# Patient Record
Sex: FEMALE | Race: WHITE | URBAN - METROPOLITAN AREA
[De-identification: names, ages, dates, MRNs, and addresses within clinical notes are randomized per-mention and may not be internally consistent; named-entity substitution may affect disease eponyms.]

---

## 2021-02-20 ENCOUNTER — IMMUNIZATION (OUTPATIENT)
Dept: PRIMARY CARE CLINIC | Age: 66
End: 2021-02-20
Payer: COMMERCIAL

## 2021-02-20 PROCEDURE — 0001A COVID-19, PFIZER VACCINE 30MCG/0.3ML DOSE: CPT | Performed by: NURSE PRACTITIONER

## 2021-02-20 PROCEDURE — 91300 COVID-19, PFIZER VACCINE 30MCG/0.3ML DOSE: CPT | Performed by: NURSE PRACTITIONER

## 2021-03-11 ENCOUNTER — IMMUNIZATION (OUTPATIENT)
Dept: PRIMARY CARE CLINIC | Age: 66
End: 2021-03-11
Payer: COMMERCIAL

## 2021-03-11 PROCEDURE — 91300 COVID-19, PFIZER VACCINE 30MCG/0.3ML DOSE: CPT | Performed by: NURSE PRACTITIONER

## 2021-03-11 PROCEDURE — 0002A COVID-19, PFIZER VACCINE 30MCG/0.3ML DOSE: CPT | Performed by: NURSE PRACTITIONER

## 2021-12-03 ENCOUNTER — IMMUNIZATION (OUTPATIENT)
Dept: PRIMARY CARE CLINIC | Age: 66
End: 2021-12-03
Payer: COMMERCIAL

## 2021-12-03 PROCEDURE — 0004A COVID-19, PFIZER VACCINE 30MCG/0.3ML DOSE: CPT | Performed by: NURSE PRACTITIONER

## 2021-12-03 PROCEDURE — 91300 COVID-19, PFIZER VACCINE 30MCG/0.3ML DOSE: CPT | Performed by: NURSE PRACTITIONER

## 2023-02-06 ENCOUNTER — INITIAL CONSULT (OUTPATIENT)
Dept: SURGERY | Age: 68
End: 2023-02-06
Payer: COMMERCIAL

## 2023-02-06 ENCOUNTER — TELEPHONE (OUTPATIENT)
Dept: BARIATRICS/WEIGHT MGMT | Age: 68
End: 2023-02-06

## 2023-02-06 VITALS
WEIGHT: 215 LBS | HEART RATE: 88 BPM | BODY MASS INDEX: 34.55 KG/M2 | SYSTOLIC BLOOD PRESSURE: 146 MMHG | TEMPERATURE: 98.2 F | DIASTOLIC BLOOD PRESSURE: 91 MMHG | HEIGHT: 66 IN

## 2023-02-06 DIAGNOSIS — M25.819: ICD-10-CM

## 2023-02-06 DIAGNOSIS — M25.512 CHRONIC LEFT SHOULDER PAIN: Primary | ICD-10-CM

## 2023-02-06 DIAGNOSIS — G89.29 CHRONIC LEFT SHOULDER PAIN: Primary | ICD-10-CM

## 2023-02-06 PROCEDURE — 3017F COLORECTAL CA SCREEN DOC REV: CPT | Performed by: SURGERY

## 2023-02-06 PROCEDURE — G8417 CALC BMI ABV UP PARAM F/U: HCPCS | Performed by: SURGERY

## 2023-02-06 PROCEDURE — 99203 OFFICE O/P NEW LOW 30 MIN: CPT | Performed by: SURGERY

## 2023-02-06 PROCEDURE — 1123F ACP DISCUSS/DSCN MKR DOCD: CPT | Performed by: SURGERY

## 2023-02-06 PROCEDURE — G8400 PT W/DXA NO RESULTS DOC: HCPCS | Performed by: SURGERY

## 2023-02-06 PROCEDURE — G8484 FLU IMMUNIZE NO ADMIN: HCPCS | Performed by: SURGERY

## 2023-02-06 PROCEDURE — 1090F PRES/ABSN URINE INCON ASSESS: CPT | Performed by: SURGERY

## 2023-02-06 PROCEDURE — 4004F PT TOBACCO SCREEN RCVD TLK: CPT | Performed by: SURGERY

## 2023-02-06 PROCEDURE — G8427 DOCREV CUR MEDS BY ELIG CLIN: HCPCS | Performed by: SURGERY

## 2023-02-06 RX ORDER — LOSARTAN POTASSIUM 50 MG/1
50 TABLET ORAL DAILY
COMMUNITY

## 2023-02-06 NOTE — PROGRESS NOTES
General Surgery History and Physical  [facility]    Patient's Name/Date of Birth: Anette Lea / 1955    Date: February 6, 2023     Surgeon: Donna Blake M.D.    PCP: Kym Zimmer MD     Chief Complaint: left shoulder mass    HPI:   Anette Lea is a 76 y.o. female who presents for evaluation of left shoulder mass. Timing is constant, radiation to left shoulder, alleviated by none and started months at least and severity is 7/10. States several months of mass on left shoulder, mild tender. She does significant lifting of boxes at work. Denies SOB, chest pain. No rapid enlargement. No numbness in her arm. It is tender while palpating it but otherwise she is not having significant pain from it. Denies similar lumps or bumps anywhere else on her body. Denies any overlying skin changes. There is no problem list on file for this patient. Past Medical History:   Diagnosis Date    Back pain     Hypertension     Thyroid disease        No past surgical history on file. Allergies   Allergen Reactions    Penicillin G Rash    Penicillins Hives and Rash       The patient has a family history that is negative for severe cardiovascular or respiratory issues, negative for reaction to anesthesia. Specifically the patient reported no history of gastrointestinal cancer in his mother or his father to their knowledge. Time spent reviewing past medical, surgical, social and family history, vitals, nursing assessment and images. No changes from above documented history.     Social History     Socioeconomic History    Marital status:      Spouse name: Not on file    Number of children: Not on file    Years of education: Not on file    Highest education level: Not on file   Occupational History    Not on file   Tobacco Use    Smoking status: Unknown    Smokeless tobacco: Not on file   Substance and Sexual Activity    Alcohol use: Not on file    Drug use: Not on file    Sexual activity: Not on file   Other Topics Concern    Not on file   Social History Narrative    Not on file     Social Determinants of Health     Financial Resource Strain: Not on file   Food Insecurity: Not on file   Transportation Needs: Not on file   Physical Activity: Not on file   Stress: Not on file   Social Connections: Not on file   Intimate Partner Violence: Not on file   Housing Stability: Not on file       I have reviewed relevant labs from this admission and interpretation is included in my assessment and plan    Review of Systems    A complete 10 system review was performed and are otherwise negative unless mentioned in the above HPI. Specific negatives are listed below but may not include all those reviewed.     General ROS: negative obtundation, AMS  ENT ROS: negative rhinorrhea, epistaxis  Allergy and Immunology ROS: negative itchy/watery eyes or nasal congestion  Hematological and Lymphatic ROS: negative spontaneous bleeding or bruising  Endocrine ROS: negative  lethargy, mood swings, palpitations or polydipsia/polyuria  Respiratory ROS: negative sputum changes, stridor, tachypnea or wheezing  Cardiovascular ROS: negative for - loss of consciousness, murmur or orthopnea  Gastrointestinal ROS: negative for - hematochezia or hematemesis  Genito-Urinary ROS: negative for -  genital discharge or hematuria  Musculoskeletal ROS: negative for - focal weakness, gangrene  Psych/Neuro ROS: negative for - visual or auditory hallucinations, suicidal ideation    Physical exam:   BP (!) 146/91   Pulse 88   Temp 98.2 °F (36.8 °C)   Ht 5' 6\" (1.676 m)   Wt 215 lb (97.5 kg)   BMI 34.70 kg/m²   General appearance:  NAD, appears stated age  Head: NCAT, PERRLA, EOMI, red conjunctiva  Neck: supple, no masses, trachea midline  Lungs: Equal chest rise bilateral, no retractions, no wheezing  Heart: Reg rate  Abdomen: soft, nontender, nondistended  Skin; warm and dry, no cyanosis  Gu: no cva tenderness  Extremities: Small 3 cm mass that is overlying the left acromioclavicular junction, ballotable consistent with likely underlying ganglionic cyst, nonmobile fixed underlying structures, no overlying skin change  Psych: No tremor, visual hallucinations      Radiology: I reviewed relevant abdominal imaging from this admission and that available in the EMR     Assessment:  Diego Calle is a 76 y.o. 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 Plan:  Ultrasound left shoulder evaluate for cyst involving tendon sheath  Pending findings she may need referral to orthopedics for excision if she is increasingly symptomatic from it  If the soft tissue mass is not adhered to underlying structures or involving tendon sheath we may be able to excise it  Telephone follow-up after ultrasound        NOTE: This report, in part or full, may have been transcribed using voice recognition software. Every effort was made to ensure accuracy; however, inadvertent computerized transcription errors may be present. Please excuse any transcriptional grammatical or spelling errors that may have escaped my editorial review.     Cinthia Gil MD  8:35 AM  2/6/2023

## 2023-02-08 ENCOUNTER — TELEPHONE (OUTPATIENT)
Dept: BARIATRICS/WEIGHT MGMT | Age: 68
End: 2023-02-08

## 2023-02-13 ENCOUNTER — SCHEDULED TELEPHONE ENCOUNTER (OUTPATIENT)
Dept: SURGERY | Age: 68
End: 2023-02-13
Payer: COMMERCIAL

## 2023-02-13 ENCOUNTER — TELEPHONE (OUTPATIENT)
Dept: SURGERY | Age: 68
End: 2023-02-13

## 2023-02-13 DIAGNOSIS — M25.819 CYST OF JOINT OF SHOULDER: Primary | ICD-10-CM

## 2023-02-13 PROCEDURE — 99441 PR PHYS/QHP TELEPHONE EVALUATION 5-10 MIN: CPT | Performed by: SURGERY

## 2023-02-13 NOTE — PROGRESS NOTES
General Surgery History and Physical  [facility]  Telephone follow up    Start time: 7602  End time: [phone] were provided through a telephone synchronous discussion virtually to substitute for in-person clinic visit. The patient was advised of the risks, benefits and alternatives to telemedicine and agreed to proceed with this type of visit. Pursuant to the emergency declaration under the [facility] [place] [address] [facility] authority and the [employer] Act, this Virtual  Visit was conducted, with patient's consent, to reduce the patient's risk of exposure to COVID-19 and provide continuity of care. The patient was also advised the privacy standards of a standard visit continue to apply to telemedicine visits. Time spent time with telephone encounter with patient and family counciling and discussing care exceeded 10min. Additional time spent reviewing images and labs, discussing case with nursing, support staff and other physicians; as well as coordinating care [id number]. Patient's Name/Date of Birth: Travon Garduno / 1955    Date: February 13, 2023     Surgeon: Rodney Hassan M.D.    PCP: Courtney Qureshi MD     Chief Complaint: left shoulder mass    HPI:   Travon Garduno is a 76 y.o. female who presents for evaluation of left shoulder mass. Timing is constant, radiation to left shoulder, alleviated by none and started months at least and severity is 7/10. States several months of mass on left shoulder, mild tender. She does significant lifting of boxes at work. Denies SOB, chest pain. No rapid enlargement. No numbness in her arm. It is tender while palpating it but otherwise she is not having significant pain from it. Denies similar lumps or bumps anywhere else on her body. Denies any overlying skin changes.     Follow-up phone follow-up after ultrasound with findings of a 1.9 x 2.3 cm septated complex cyst.  Denies any significant change in her complaints since her last visit. There is no problem list on file for this patient. Past Medical History:   Diagnosis Date    Back pain     Hypertension     Thyroid disease        No past surgical history on file. Allergies   Allergen Reactions    Penicillin G Rash    Penicillins Hives and Rash       The patient has a family history that is negative for severe cardiovascular or respiratory issues, negative for reaction to anesthesia. Specifically the patient reported no history of gastrointestinal cancer in his mother or his father to their knowledge. Time spent reviewing past medical, surgical, social and family history, vitals, nursing assessment and images. No changes from above documented history. Social History     Socioeconomic History    Marital status:      Spouse name: Not on file    Number of children: Not on file    Years of education: Not on file    Highest education level: Not on file   Occupational History    Not on file   Tobacco Use    Smoking status: Unknown    Smokeless tobacco: Not on file   Substance and Sexual Activity    Alcohol use: Not on file    Drug use: Not on file    Sexual activity: Not on file   Other Topics Concern    Not on file   Social History Narrative    Not on file     Social Determinants of Health     Financial Resource Strain: Not on file   Food Insecurity: Not on file   Transportation Needs: Not on file   Physical Activity: Not on file   Stress: Not on file   Social Connections: Not on file   Intimate Partner Violence: Not on file   Housing Stability: Not on file       I have reviewed relevant labs from this admission and interpretation is included in my assessment and plan    Review of Systems    A complete 10 system review was performed and are otherwise negative unless mentioned in the above HPI. Specific negatives are listed below but may not include all those reviewed.     General ROS: negative obtundation, AMS  ENT ROS: negative rhinorrhea, epistaxis  Allergy and Immunology ROS: negative itchy/watery eyes or nasal congestion  Hematological and Lymphatic ROS: negative spontaneous bleeding or bruising  Endocrine ROS: negative  lethargy, mood swings, palpitations or polydipsia/polyuria  Respiratory ROS: negative sputum changes, stridor, tachypnea or wheezing  Cardiovascular ROS: negative for - loss of consciousness, murmur or orthopnea  Gastrointestinal ROS: negative for - hematochezia or hematemesis  Genito-Urinary ROS: negative for -  genital discharge or hematuria  Musculoskeletal ROS: negative for - focal weakness, gangrene  Psych/Neuro ROS: negative for - visual or auditory hallucinations, suicidal ideation    Physical exam:   There were no vitals taken for this visit. General appearance:  NAD, appears stated age  Head: NCAT, PERRLA, EOMI, red conjunctiva  Neck: supple, no masses, trachea midline  Lungs: Equal chest rise bilateral, no retractions, no wheezing  Heart: Reg rate  Abdomen: soft, nontender, nondistended  Skin; warm and dry, no cyanosis  Gu: no cva tenderness  Extremities: Small 3 cm mass that is overlying the left acromioclavicular junction, ballotable consistent with likely underlying ganglionic cyst, nonmobile fixed underlying structures, no overlying skin change  Psych: No tremor, visual hallucinations      Radiology: I reviewed relevant abdominal imaging from this admission and that available in the EMR   Impression   2.3 cm complex cyst with septation left anterior shoulder in the area of   palpable concern. RECOMMENDATION:   Clinical follow-up. Assessment:  Cassia Walker is a 76 y.o. 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         Plan:  Referral to orthopedics- suspect ganglionic cyst but septation concerning  Follow up as needed with me        NOTE: This report, in part or full, may have been transcribed using voice recognition software. Every effort was made to ensure accuracy; however, inadvertent computerized transcription errors may be present. Please excuse any transcriptional grammatical or spelling errors that may have escaped my editorial review.     Karla Conroy MD  10:37 AM  2/13/2023

## 2023-02-13 NOTE — TELEPHONE ENCOUNTER
Referral sent to Dr. Destinee Bourgeois for shoulder joint cyst.   Office to contact patient to schedule.   Electronically signed by Jc Villa RN on 2/13/2023 at 1:42 PM

## 2023-02-24 DIAGNOSIS — M25.512 LEFT SHOULDER PAIN, UNSPECIFIED CHRONICITY: Primary | ICD-10-CM

## 2023-02-28 ENCOUNTER — OFFICE VISIT (OUTPATIENT)
Dept: ORTHOPEDIC SURGERY | Age: 68
End: 2023-02-28
Payer: COMMERCIAL

## 2023-02-28 VITALS — BODY MASS INDEX: 34.55 KG/M2 | TEMPERATURE: 98 F | HEIGHT: 66 IN | WEIGHT: 215 LBS

## 2023-02-28 DIAGNOSIS — M19.012 ARTHRITIS OF LEFT ACROMIOCLAVICULAR JOINT: Primary | ICD-10-CM

## 2023-02-28 PROCEDURE — 1090F PRES/ABSN URINE INCON ASSESS: CPT | Performed by: ORTHOPAEDIC SURGERY

## 2023-02-28 PROCEDURE — 1123F ACP DISCUSS/DSCN MKR DOCD: CPT | Performed by: ORTHOPAEDIC SURGERY

## 2023-02-28 PROCEDURE — 99202 OFFICE O/P NEW SF 15 MIN: CPT | Performed by: ORTHOPAEDIC SURGERY

## 2023-02-28 PROCEDURE — G8417 CALC BMI ABV UP PARAM F/U: HCPCS | Performed by: ORTHOPAEDIC SURGERY

## 2023-02-28 PROCEDURE — G8427 DOCREV CUR MEDS BY ELIG CLIN: HCPCS | Performed by: ORTHOPAEDIC SURGERY

## 2023-02-28 PROCEDURE — G8400 PT W/DXA NO RESULTS DOC: HCPCS | Performed by: ORTHOPAEDIC SURGERY

## 2023-02-28 PROCEDURE — 4004F PT TOBACCO SCREEN RCVD TLK: CPT | Performed by: ORTHOPAEDIC SURGERY

## 2023-02-28 PROCEDURE — 3017F COLORECTAL CA SCREEN DOC REV: CPT | Performed by: ORTHOPAEDIC SURGERY

## 2023-02-28 PROCEDURE — G8484 FLU IMMUNIZE NO ADMIN: HCPCS | Performed by: ORTHOPAEDIC SURGERY

## 2023-02-28 NOTE — PROGRESS NOTES
Chief Complaint   Patient presents with    Shoulder Pain     New patient left shoulder bump for about 2 months. Patient states she has bumped it a few times but states no major injury. Bump is located on the distal end of the clavicle. US and xrays have been done. No treatment to date.        Leticia Nogueira is a 68 y.o. year old   female who is seen today  for evaluation of left shoulder pain.  She reports the pain has been ongoing for the past 2 months.  She does not recall a specific injury which started the pain.  .   She reports the pain is worse with , pain has resolved, cyst has gone down, better with rest.  The patient does not have mechanical symptoms.  Shedoes not have night pain.  She denies a feeling of instability.  The prior treatments have been none.  The patient   has responded to the treatment. The patient is right hand dominant.     Chief Complaint   Patient presents with    Shoulder Pain     New patient left shoulder bump for about 2 months. Patient states she has bumped it a few times but states no major injury. Bump is located on the distal end of the clavicle. US and xrays have been done. No treatment to date.     Past Medical History:   Diagnosis Date    Back pain     Hypertension     Thyroid disease      No past surgical history on file.    Current Outpatient Medications:     losartan (COZAAR) 50 MG tablet, Take 50 mg by mouth daily, Disp: , Rfl:     levothyroxine (SYNTHROID) 88 MCG tablet, Take 100 mcg by mouth Daily, Disp: , Rfl:     gabapentin (NEURONTIN) 300 MG capsule, Take 300 mg by mouth nightly, Disp: , Rfl:     valsartan (DIOVAN) 80 MG tablet, Take 80 mg by mouth daily (Patient not taking: Reported on 2/6/2023), Disp: , Rfl:     naproxen (NAPROSYN) 500 MG tablet, Take 1 tablet by mouth 2 times daily as needed for Pain (Patient not taking: Reported on 2/6/2023), Disp: 10 tablet, Rfl: 0  Allergies   Allergen Reactions    Sulfa Antibiotics Other (See Comments)    Penicillin G Rash  Penicillins Hives and Rash     Social History     Socioeconomic History    Marital status:      Spouse name: Not on file    Number of children: Not on file    Years of education: Not on file    Highest education level: Not on file   Occupational History    Not on file   Tobacco Use    Smoking status: Unknown    Smokeless tobacco: Not on file   Substance and Sexual Activity    Alcohol use: Not on file    Drug use: Not on file    Sexual activity: Not on file   Other Topics Concern    Not on file   Social History Narrative    Not on file     Social Determinants of Health     Financial Resource Strain: Not on file   Food Insecurity: Not on file   Transportation Needs: Not on file   Physical Activity: Not on file   Stress: Not on file   Social Connections: Not on file   Intimate Partner Violence: Not on file   Housing Stability: Not on file     No family history on file. REVIEW OF SYSTEMS:     General/Constitution:  (-)weight loss, (-)fever, (-)chills, (-)weakness. Skin: (-) rash,(-) psoriasis,(-) eczema, (-)skin cancer. Musculoskeletal: (-) fractures,  (-) dislocations,(-) collagen vascular disease, (-) fibromyalgia, (-) multiple sclerosis, (-) muscular dystrophy, (-) RSD,(-) joint pain (-)swelling, (-) joint pain,swelling. Neurologic: (-) epilepsy, (-)seizures,(-) brain tumor,(-) TIA, (-)stroke, (-)headaches, (-)Parkinson disease,(-) memory loss, (-) LOC. Cardiovascular: (-) Chest pain, (-) swelling in legs/feet, (-) SOB, (-) cramping in legs/feet with walking. Respiratory: (-) SOB, (-) Coughing, (-) night sweats. GI: (-) nausea, (-) vomiting, (-) diarrhea, (-) blood in stool, (-) gastric ulcer. Psychiatric: (-) Depression, (-) Anxiety, (-) bipolar disease, (-) Alzheimer's Disease  Allergic/Immunologic: (-) allergies latex, (-) allergies metal, (-) skin sensitivity.   Hematlogic: (-) anemia, (-) blood transfusion, (-) DVT/PE, (-) Clotting disorders      Subjective:    Constitution:  Temp 98 °F (36.7 °C)   Ht 5' 6\" (1.676 m)   Wt 215 lb (97.5 kg)   BMI 34.70 kg/m²     Psycihatric:  The patient is alert and oriented x 3, appears to be stated age and in no distress. Respiratory:  Respiratory effort is not labored. Patient is not gasping. Palpation of the chest reveals no tactile fremitus. Skin:  Upon inspection: the skin appears warm, dry and intact. There is not a previous scar over the affected area. There is not any cellulitis, lymphedema or cutaneous lesions noted in the lower extremities. Upon palpation there is no induration noted. Neurologic:  Motor exam of the upper extremities show: The reflexes in biceps/triceps/brachioradialis are equal and symmetric. Sensory exam C5-T1 are normal bilaterally. Cardiovascular: The vascular exam is normal and is well perfused to distal extremities. There are 2+ radial pulses bilaterally, and motor and sensation is intact to median, ulnar, and radial, musclocutaneus, and axillary nerve distribution and grossly symmetric bilaterally. There is cap refill noted less than two seconds in all digits. There is not edema of the bilateral upper extremities. There is not varicosities noted in the distal extremities. Lymph:  Upon palpation,  there is no lymphadenopathy noted in bilateral upper extremities. Musculoskeletal:  Gait: normal; examination of the nails and digits reveal no cyanosis or clubbing. Cervical Exam:  On physical exam, Denis Zhu is well-developed, well-nourished, oriented to person, place and time. her gait is normal.  On evaluation of hercervical spine, She has full range of motion of the cervical spine without pain. There is no cervical tenderness to palpation. Shoulder Exam:  On evaluation of her bilaterally upper extremities, her left shoulder has no deformity. There is tenderness upon palpation of the ac joint. There is not evidence of scapular dyskinesis.   There is not muscle atrophy in shoulder girdle. The range of motion for the Right Shoulder is 150/50/t8 and for the Left shoulder is 150/50/t8. Right shoulder Motor strength is 5/5 in the supraspinatus, 5/5 internal rotation and 5/5 in external rotation, and Left shoulder motor strength 5/5 in supraspinatus, 5/5 in internal rotation, 5/5 in external rotation. Small Ac joint ganglion cyst    Right shoulder:  negative Impingement , negative Nunez ,negative  Speeds,negative  Apprehension ,negative Amanda Load Shift, negative Shaq manuver, negative Cross arm test.     Left shoulder:  negative Impingement , negative Nunez ,negative  Speeds,negative  Apprehension ,negative Amanda Load Shift, negative Shaq manuver, negative Cross arm test.     XRAY:  ac joint arthritis    MRI:  n/a    Radiographic findings reviewed with patient    Impression:   Encounter Diagnosis   Name Primary? Arthritis of left acromioclavicular joint Yes       Plan: Natural history and expected course discussed. Questions answered. Educational material distributed. Reduction in offending activity. Gentle ROM exercises  I had a lengthy discussion with the patient regarding their diagnosis. I explained treatment options including surgical vs non surgical treatment. I reviewed in detail the risks and benefits and outlined the procedure in detail with expected outcomes and possible complications. I also discussed non surgical treatment such as injections (CSI and visco supplementation), physical therapy, topical creams and NSAID's. They have elected for conservative management at this time.    Pain has resolved, she will fu prn

## 2023-03-15 ENCOUNTER — OFFICE VISIT (OUTPATIENT)
Dept: BARIATRICS/WEIGHT MGMT | Age: 68
End: 2023-03-15
Payer: COMMERCIAL

## 2023-03-15 VITALS
HEART RATE: 67 BPM | TEMPERATURE: 97.8 F | SYSTOLIC BLOOD PRESSURE: 140 MMHG | BODY MASS INDEX: 41.11 KG/M2 | HEIGHT: 63 IN | WEIGHT: 232 LBS | DIASTOLIC BLOOD PRESSURE: 81 MMHG

## 2023-03-15 DIAGNOSIS — E66.01 CLASS 3 SEVERE OBESITY DUE TO EXCESS CALORIES WITHOUT SERIOUS COMORBIDITY WITH BODY MASS INDEX (BMI) OF 40.0 TO 44.9 IN ADULT (HCC): ICD-10-CM

## 2023-03-15 DIAGNOSIS — I10 ESSENTIAL HYPERTENSION: Primary | ICD-10-CM

## 2023-03-15 PROCEDURE — 99205 OFFICE O/P NEW HI 60 MIN: CPT | Performed by: INTERNAL MEDICINE

## 2023-03-15 PROCEDURE — G8400 PT W/DXA NO RESULTS DOC: HCPCS | Performed by: INTERNAL MEDICINE

## 2023-03-15 PROCEDURE — G8484 FLU IMMUNIZE NO ADMIN: HCPCS | Performed by: INTERNAL MEDICINE

## 2023-03-15 PROCEDURE — 3017F COLORECTAL CA SCREEN DOC REV: CPT | Performed by: INTERNAL MEDICINE

## 2023-03-15 PROCEDURE — 3077F SYST BP >= 140 MM HG: CPT | Performed by: INTERNAL MEDICINE

## 2023-03-15 PROCEDURE — 1036F TOBACCO NON-USER: CPT | Performed by: INTERNAL MEDICINE

## 2023-03-15 PROCEDURE — G8427 DOCREV CUR MEDS BY ELIG CLIN: HCPCS | Performed by: INTERNAL MEDICINE

## 2023-03-15 PROCEDURE — G8417 CALC BMI ABV UP PARAM F/U: HCPCS | Performed by: INTERNAL MEDICINE

## 2023-03-15 PROCEDURE — 99202 OFFICE O/P NEW SF 15 MIN: CPT

## 2023-03-15 PROCEDURE — 3078F DIAST BP <80 MM HG: CPT | Performed by: INTERNAL MEDICINE

## 2023-03-15 PROCEDURE — 1123F ACP DISCUSS/DSCN MKR DOCD: CPT | Performed by: INTERNAL MEDICINE

## 2023-03-15 PROCEDURE — 1090F PRES/ABSN URINE INCON ASSESS: CPT | Performed by: INTERNAL MEDICINE

## 2023-03-15 RX ORDER — LEVOTHYROXINE SODIUM 100 MCG
100 TABLET ORAL DAILY
COMMUNITY
Start: 2023-03-07

## 2023-03-15 RX ORDER — PHENTERMINE HYDROCHLORIDE 37.5 MG/1
37.5 TABLET ORAL
Qty: 30 TABLET | Refills: 0 | Status: SHIPPED | OUTPATIENT
Start: 2023-03-15 | End: 2023-04-14

## 2023-03-15 NOTE — PROGRESS NOTES
CC -   HTN, has back and brinda right leg pain, Obesity    BACKGROUND -   First visit: 3/15/23    Obesity (all weight in lbs)  Began on and off all her life - (thyroid medicine started in 1996), brinda after marriage  Initial Ht 63.25\", Wt 232.0,  BMI 40.77  HS Grad wt 140-145   Lowest   wt 135   Highest  wt 265 (1991-93)  Pattern of wt gain: gradual ->up and down  Wt change past yr: ~-20-25 lbs  Most wt lost: unsure - ~35 lbs  Other diets attempted: intermittent fasting with walking, tried some diet plans in the past    Desire to lose weight: ---/10    Initial Diet:    Number of meals per day - 3    Number of snacks per day - 1    Meal volume - 8\" plate,  occasionally seconds    Fast food/convenience store - 1x/week    Restaurants (not fast food) - 2x/week   Sweets - 0d/week   Chips - 3-4d/week   Crackers/pretzels - 3-4d/week   Nuts - 3-4d/week - (combined 7d/wk)   Peanut Butter - 1-2d/week (1 tbsp on waffle)   Popcorn - 0d/week   Dried fruit - 0d/week   Whole fruit - 7d/week (banana daily, blueberry/apple 2-3d/wk)   Breakfast cereal - 0-1d/week -small cup of oatmeal   Granola/Protein/Energy bar - 0d/week   Sugar sweetened beverages - occ pop/soda <1x/wk gingerale, apple juice 2-3x/wk if not apple, coffee 18 oz/day no sugar with cream -  small cups 10 cups of regular coffeemate, ~1x/wk tea with sugar or 1 tsp honey, no energy drinks   Protein - No supplements   Fiber - No supplements    Mandarin orange with 100% juice or grapefruit sections with 100% juice daily. Wt effect of HR foods = 1400 Shade/wk = 200 Shade/d= 11.3% DEN = 21 lb/year. Initial Exercise:    [employer] - not currently, has foot pedal at home    Walking - 20+20 min 5d/wk at work, plus on weekends when weather is nice.     Running - no    Resistance - no    Aerobic class - pedals, see above     Sleep: ~7 hrs, rested mostly.  ______________________    [facility] -  Past Medical History:   Diagnosis Date    Back pain     Hypertension     Thyroid disease Past Surgical History:   Procedure Laterality Date    BREAST LUMPECTOMY Left     2 lumpectomy - was benign     SECTION      2 C-sections       Prior to Admission medications    Medication Sig Start Date End Date Taking? Authorizing Provider   SYNTHROID 100 MCG tablet Take 100 mcg by mouth daily 3/7/23   Historical Provider, MD   losartan (COZAAR) 50 MG tablet Take 50 mg by mouth daily    Historical Provider, MD   gabapentin (NEURONTIN) 300 MG capsule Take 300 mg by mouth nightly    Historical Provider, MD   Vitamin D ?2000 IU daily. Allergies: Allergies   Allergen Reactions    Sulfa Antibiotics Other (See Comments)    Penicillin G Rash    Penicillins Hives and Rash     Social history: smoking: quit in  ; Alcohol: occasional , work: deputy recorder for [employer]. ROS -  Card - no CP  GI - no N/V/D/C, sometimes has to take Miralax. PE -  Gen : BP (!) 140/81 (Site: Left Upper Arm, Position: Sitting, Cuff Size: Large Adult)   Pulse 67   Temp 97.8 °F (36.6 °C) (Temporal)   Ht 5' 3.25\" (1.607 m)   Wt 232 lb (105.2 kg)   BMI 40.77 kg/m²    WN, WD, NAD  Lung: Nml resp effort, CTA B/L  Heart:  RRR w/o MGR, no LE pitting edema b/l  Psych: Normal mood   Full affect  Neuro: Moves all ext well  ______________________    HISTORY & ASSESSMENT/PLAN -     Problem 1 - Hypertension   HPI   -  On Losartan 50, BP high today ->repeat BP was better, usu controlled per pt, patient asymptomatic. Assessment  - fairly controlled  Plan   - Continue medication, monitor. Weight reduction can help. Weight reduction per plan below      Problem 2  - Obesity   HPI   - See above Background for description    Weight  Date    232.0  3/15/23    Patient's estimated daily energy need (DEN) = 1763 Shade/d = 48369 Shade/wk    Assessment  - Uncontrolled    Plan   - Talked about various options. Does not want VLCD. Would like calorie counting with low calorie diet as detailed below.  Would like an appetite suppressant, and after talking about options and side effects, opted for Phentermine. Planned as follows:  Patient Instructions   Rules:  Count every calorie every day (you can use free wilver such as '[employer]' or 'nutritionix track')  Limit sweets to one day per month  Limit chips/crackers/pretzels/nuts/popcorn to 150 shade/day  Eliminate all sugar sweetened beverages (including fruit juice)  Limit restaurants (including fast food and food from a convenience store) to one time every two weeks while in town    Requirements:  Make sure protein intake is at least 65 grams per day (do not count protein every day; instead spot check your intake every 2-3 weeks and make sure what you think you are getting is close to accurate; consider using a protein shake if needed; these are in the pharmacy section of the stores, not the grocery section; Premier, Pure Protein and Fairlife are relatively inexpensive and taste good to most patients; other options are Nectar, Boost Max, Ensure Max, BeneProtein and GNC lean (which is lactose-free); Nectar fruit, Premier Protein Clear, IsoPure Protein Drink, and Protein 2 O are water-based options; Quest (or Cosco, which is cheaper and is ordered on [employer]) and the Rezora 1 protein bars can also be used, but have less protein in them ) (<200 Shade, >25 g protein) - (chicken, fish, turkey, egg white)  (Disclaimer: Dietary supplements rarely have their listed ingredients and the amount of each verified by a third party other. Sometimes they give verification for their claims to be GMO and gluten free and to be organic. However, even such verifications as these may still be untrustworthy.)  Make sure that fiber intake is at least 25 grams per day. Do this by either eating 12 tablespoons of the original, plain Fiber One cereal every day or 4 tablespoons of wheat dextrin powder (Benefiber or a generic brand) every day.  Work up to this amount slowly by starting with only one-eighth to one-fourth of the target amount and then adding another one-eighth to one-fourth every one or two weeks until reaching the target. Take one multivitamin every day    Targets:  Limit calorie intake to 1150 calories/day  Walk 30 minutes dailyor equivalent  Avoid eating 2 hours within bedtime. Tips:  Do not eat outside of the dining room or the kitchen  Do not eat while watching TV, videos, working on the computer or using a smart phone  Do not eat food out of a multi-serving bag or container. Phentermine:  Take phentermine 37.5 mg, one-half to one tablet daily as needed for appetite suppression. Take each dose 30-90 min before effect will be needed. While taking phentermine, check the Blood Pressure every morning and every evening. If the systolic BP is >050 mmHg, the diastolic BP is >01 mm/Hg or the heart rate is > 100 beats per minute, do not take phentermine that day. If the systolic BP is consistently >155 mmHg, the diastolic BP is consistently above 90 mm/Hg or the heart rate is consistently > 100 beats per minute, then stop taking phentermine altogether. If the systolic BP >108 mmHg or the diastolic BP is >394 mmHg (even if it is only once), then phentermine should be stopped altogether without proving that any of these are consistently elevated. Follow up in 30 days. Total time spent on encounter: 72 min. Pranav Mariano MD  Internal Medicine/Obesity Medicine  3/15/2023.

## 2023-03-15 NOTE — PATIENT INSTRUCTIONS
Rules:  Count every calorie every day (you can use free wilver such as 'baritastic' or 'nutritionix track')  Limit sweets to one day per month  Limit chips/crackers/pretzels/nuts/popcorn to 150 shade/day  Eliminate all sugar sweetened beverages (including fruit juice)  Limit restaurants (including fast food and food from a convenience store) to one time every two weeks while in town    Requirements:  Make sure protein intake is at least 65 grams per day (do not count protein every day; instead spot check your intake every 2-3 weeks and make sure what you think you are getting is close to accurate; consider using a protein shake if needed; these are in the pharmacy section of the stores, not the grocery section; Premier, Pure Protein and Fairlife are relatively inexpensive and taste good to most patients; other options are Nectar, Boost Max, Ensure Max, BeneProtein and GNC lean (which is lactose-free); Nectar fruit, Premier Protein Clear, IsoPure Protein Drink, and Protein 2 O are water-based options; Quest (or Cosco, which is cheaper and is ordered on [employer]) and the Infinite.ly protein bars can also be used, but have less protein in them ) (<200 Shade, >25 g protein) - (chicken, fish, turkey, egg white)  (Disclaimer: Dietary supplements rarely have their listed ingredients and the amount of each verified by a third party other. Sometimes they give verification for their claims to be GMO and gluten free and to be organic. However, even such verifications as these may still be untrustworthy.)  Make sure that fiber intake is at least 25 grams per day. Do this by either eating 12 tablespoons of the original, plain Fiber One cereal every day or 4 tablespoons of wheat dextrin powder (Benefiber or a generic brand) every day. Work up to this amount slowly by starting with only one-eighth to one-fourth of the target amount and then adding another one-eighth to one-fourth every one or two weeks until reaching the target.   Take one multivitamin every day    Targets:  Limit calorie intake to 1150 calories/day  Walk 30 minutes dailyor equivalent  Avoid eating 2 hours within bedtime. Tips:  Do not eat outside of the dining room or the kitchen  Do not eat while watching TV, videos, working on the computer or using a smart phone  Do not eat food out of a multi-serving bag or container. Phentermine:  Take phentermine 37.5 mg, one-half to one tablet daily as needed for appetite suppression. Take each dose 30-90 min before effect will be needed. While taking phentermine, check the Blood Pressure every morning and every evening. If the systolic BP is >549 mmHg, the diastolic BP is >21 mm/Hg or the heart rate is > 100 beats per minute, do not take phentermine that day. If the systolic BP is consistently >155 mmHg, the diastolic BP is consistently above 90 mm/Hg or the heart rate is consistently > 100 beats per minute, then stop taking phentermine altogether. If the systolic BP >364 mmHg or the diastolic BP is >902 mmHg (even if it is only once), then phentermine should be stopped altogether without proving that any of these are consistently elevated. Follow up in 30 days.

## 2023-04-18 ENCOUNTER — OFFICE VISIT (OUTPATIENT)
Dept: BARIATRICS/WEIGHT MGMT | Age: 68
End: 2023-04-18
Payer: MEDICARE

## 2023-04-18 VITALS
HEART RATE: 62 BPM | BODY MASS INDEX: 39.51 KG/M2 | SYSTOLIC BLOOD PRESSURE: 150 MMHG | HEIGHT: 63 IN | TEMPERATURE: 97.1 F | WEIGHT: 223 LBS | DIASTOLIC BLOOD PRESSURE: 61 MMHG

## 2023-04-18 DIAGNOSIS — E66.01 CLASS 2 SEVERE OBESITY DUE TO EXCESS CALORIES WITH SERIOUS COMORBIDITY AND BODY MASS INDEX (BMI) OF 39.0 TO 39.9 IN ADULT (HCC): ICD-10-CM

## 2023-04-18 DIAGNOSIS — I10 ESSENTIAL HYPERTENSION: Primary | ICD-10-CM

## 2023-04-18 PROCEDURE — G8417 CALC BMI ABV UP PARAM F/U: HCPCS | Performed by: INTERNAL MEDICINE

## 2023-04-18 PROCEDURE — 99211 OFF/OP EST MAY X REQ PHY/QHP: CPT | Performed by: INTERNAL MEDICINE

## 2023-04-18 PROCEDURE — G8400 PT W/DXA NO RESULTS DOC: HCPCS | Performed by: INTERNAL MEDICINE

## 2023-04-18 PROCEDURE — 1123F ACP DISCUSS/DSCN MKR DOCD: CPT | Performed by: INTERNAL MEDICINE

## 2023-04-18 PROCEDURE — 1036F TOBACCO NON-USER: CPT | Performed by: INTERNAL MEDICINE

## 2023-04-18 PROCEDURE — 99214 OFFICE O/P EST MOD 30 MIN: CPT | Performed by: INTERNAL MEDICINE

## 2023-04-18 PROCEDURE — 1090F PRES/ABSN URINE INCON ASSESS: CPT | Performed by: INTERNAL MEDICINE

## 2023-04-18 PROCEDURE — 3078F DIAST BP <80 MM HG: CPT | Performed by: INTERNAL MEDICINE

## 2023-04-18 PROCEDURE — 3074F SYST BP LT 130 MM HG: CPT | Performed by: INTERNAL MEDICINE

## 2023-04-18 PROCEDURE — 3017F COLORECTAL CA SCREEN DOC REV: CPT | Performed by: INTERNAL MEDICINE

## 2023-04-18 PROCEDURE — G8428 CUR MEDS NOT DOCUMENT: HCPCS | Performed by: INTERNAL MEDICINE

## 2023-04-18 RX ORDER — PHENTERMINE HYDROCHLORIDE 37.5 MG/1
37.5 TABLET ORAL
Qty: 30 TABLET | Refills: 0 | Status: SHIPPED | OUTPATIENT
Start: 2023-04-18 | End: 2023-05-18

## 2023-05-16 ENCOUNTER — OFFICE VISIT (OUTPATIENT)
Dept: BARIATRICS/WEIGHT MGMT | Age: 68
End: 2023-05-16
Payer: COMMERCIAL

## 2023-05-16 VITALS
WEIGHT: 220.8 LBS | SYSTOLIC BLOOD PRESSURE: 148 MMHG | BODY MASS INDEX: 39.12 KG/M2 | HEIGHT: 63 IN | DIASTOLIC BLOOD PRESSURE: 69 MMHG | HEART RATE: 57 BPM | TEMPERATURE: 97.1 F

## 2023-05-16 DIAGNOSIS — I10 ESSENTIAL HYPERTENSION: Primary | ICD-10-CM

## 2023-05-16 DIAGNOSIS — E66.01 CLASS 2 SEVERE OBESITY DUE TO EXCESS CALORIES WITH SERIOUS COMORBIDITY AND BODY MASS INDEX (BMI) OF 39.0 TO 39.9 IN ADULT (HCC): ICD-10-CM

## 2023-05-16 PROCEDURE — G8417 CALC BMI ABV UP PARAM F/U: HCPCS | Performed by: INTERNAL MEDICINE

## 2023-05-16 PROCEDURE — 3078F DIAST BP <80 MM HG: CPT | Performed by: INTERNAL MEDICINE

## 2023-05-16 PROCEDURE — 1036F TOBACCO NON-USER: CPT | Performed by: INTERNAL MEDICINE

## 2023-05-16 PROCEDURE — 1090F PRES/ABSN URINE INCON ASSESS: CPT | Performed by: INTERNAL MEDICINE

## 2023-05-16 PROCEDURE — G8400 PT W/DXA NO RESULTS DOC: HCPCS | Performed by: INTERNAL MEDICINE

## 2023-05-16 PROCEDURE — 99211 OFF/OP EST MAY X REQ PHY/QHP: CPT

## 2023-05-16 PROCEDURE — G8428 CUR MEDS NOT DOCUMENT: HCPCS | Performed by: INTERNAL MEDICINE

## 2023-05-16 PROCEDURE — 99214 OFFICE O/P EST MOD 30 MIN: CPT | Performed by: INTERNAL MEDICINE

## 2023-05-16 PROCEDURE — 3077F SYST BP >= 140 MM HG: CPT | Performed by: INTERNAL MEDICINE

## 2023-05-16 PROCEDURE — 3017F COLORECTAL CA SCREEN DOC REV: CPT | Performed by: INTERNAL MEDICINE

## 2023-05-16 PROCEDURE — 1123F ACP DISCUSS/DSCN MKR DOCD: CPT | Performed by: INTERNAL MEDICINE

## 2023-05-16 RX ORDER — PHENTERMINE HYDROCHLORIDE 37.5 MG/1
37.5 TABLET ORAL
Qty: 30 TABLET | Refills: 0 | Status: SHIPPED | OUTPATIENT
Start: 2023-05-16 | End: 2023-06-15

## 2023-05-16 NOTE — PROGRESS NOTES
CC -   Follow up of: HTN, has back and brinda right leg pain, Obesity    BACKGROUND -   Last visit: 4/18/23  First visit: 3/15/23    Obesity (all weight in lbs)  Began on and off all her life - (thyroid medicine started in 1996), brinda after marriage  Initial Ht 63.25\", Wt 232.0,  BMI 40.77  HS Grad wt 140-145   Lowest   wt 135   Highest  wt 265 (1991-93)  Pattern of wt gain: gradual ->up and down  Wt change past yr: ~-20-25 lbs  Most wt lost: unsure - ~35 lbs  Other diets attempted: intermittent fasting with walking, tried some diet plans in the past    Desire to lose weight: ---/10    Initial Diet:    Number of meals per day - 3    Number of snacks per day - 1    Meal volume - 8\" plate,  occasionally seconds    Fast food/convenience store - 1x/week    Restaurants (not fast food) - 2x/week   Sweets - 0d/week   Chips - 3-4d/week   Crackers/pretzels - 3-4d/week   Nuts - 3-4d/week - (combined 7d/wk)   Peanut Butter - 1-2d/week (1 tbsp on waffle)   Popcorn - 0d/week   Dried fruit - 0d/week   Whole fruit - 7d/week (banana daily, blueberry/apple 2-3d/wk)   Breakfast cereal - 0-1d/week -small cup of oatmeal   Granola/Protein/Energy bar - 0d/week   Sugar sweetened beverages - occ pop/soda <1x/wk gingerale, apple juice 2-3x/wk if not apple, coffee 18 oz/day no sugar with cream -  small cups 10 cups of regular coffeemate, ~1x/wk tea with sugar or 1 tsp honey, no energy drinks   Protein - No supplements   Fiber - No supplements    Mandarin orange with 100% juice or grapefruit sections with 100% juice daily. Wt effect of HR foods = 1400 Shade/wk = 200 Shade/d= 11.3% DEN = 21 lb/year. Initial Exercise:    [employer] - not currently, has foot pedal at home    Walking - 20+20 min 5d/wk at work, plus on weekends when weather is nice.     Running - no    Resistance - no    Aerobic class - pedals, see above     Sleep: ~7 hrs, rested mostly.  ______________________    [facility] -  Past Medical History:   Diagnosis Date    Back pain

## 2023-05-16 NOTE — PATIENT INSTRUCTIONS
Rules:  Count every calorie every day  Limit sweets to one day per month  Limit chips/crackers/pretzels/nuts/popcorn to 150 emilie/day  Eliminate all sugar sweetened beverages (including fruit juice)  Limit restaurants (including fast food and food from a convenience store) to one time every two weeks while in town    Requirements:  Make sure protein intake is at least 65 grams per day  Make sure that fiber intake is at least 25 grams per day  Take one multivitamin every day    Targets:  Limit calorie intake to 1150 calories/day  Walk 30 minutes dailyor equivalent  Avoid eating 2 hours within bedtime. Tips:  Do not eat outside of the dining room or the kitchen  Do not eat while watching TV, videos, working on the computer or using a smart phone  Do not eat food out of a multi-serving bag or container. List of low calorie non-starchy vegetables was provided. Phentermine:  Take phentermine 37.5 mg, one-half to one tablet daily as needed for appetite suppression. Take each dose 30-90 min before effect will be needed. While taking phentermine, check the Blood Pressure every morning and every evening. If the systolic BP is >214 mmHg, the diastolic BP is >69 mm/Hg or the heart rate is > 100 beats per minute, do not take phentermine that day. If the systolic BP is consistently >155 mmHg, the diastolic BP is consistently above 90 mm/Hg or the heart rate is consistently > 100 beats per minute, then stop taking phentermine altogether. If the systolic BP >066 mmHg or the diastolic BP is >202 mmHg (even if it is only once), then phentermine should be stopped altogether without proving that any of these are consistently elevated. Follow up in 1 month.

## 2023-06-16 ENCOUNTER — APPOINTMENT (OUTPATIENT)
Dept: CT IMAGING | Age: 68
End: 2023-06-16
Payer: COMMERCIAL

## 2023-06-16 ENCOUNTER — HOSPITAL ENCOUNTER (EMERGENCY)
Age: 68
Discharge: HOME | End: 2023-06-16
Payer: COMMERCIAL

## 2023-06-16 VITALS
BODY MASS INDEX: 34.55 KG/M2 | OXYGEN SATURATION: 100 % | WEIGHT: 215 LBS | RESPIRATION RATE: 20 BRPM | TEMPERATURE: 98.7 F | HEIGHT: 66 IN | HEART RATE: 58 BPM | DIASTOLIC BLOOD PRESSURE: 78 MMHG | SYSTOLIC BLOOD PRESSURE: 150 MMHG

## 2023-06-16 DIAGNOSIS — S09.90XA INJURY OF HEAD, INITIAL ENCOUNTER: Primary | ICD-10-CM

## 2023-06-16 PROCEDURE — G0463 HOSPITAL OUTPT CLINIC VISIT: HCPCS

## 2023-06-16 PROCEDURE — 70450 CT HEAD/BRAIN W/O DYE: CPT

## 2023-06-16 PROCEDURE — 99211 OFF/OP EST MAY X REQ PHY/QHP: CPT

## 2023-06-16 ASSESSMENT — PAIN SCALES - GENERAL: PAINLEVEL_OUTOF10: 0

## 2023-06-16 ASSESSMENT — PAIN - FUNCTIONAL ASSESSMENT: PAIN_FUNCTIONAL_ASSESSMENT: 0-10

## 2023-06-16 NOTE — ED PROVIDER NOTES
PHYSICAL EXAM  1 or more Elements     ED Triage Vitals [06/16/23 1833]   BP Temp Temp src Pulse Respirations SpO2 Height Weight - Scale   (!) 150/78 98.7 °F (37.1 °C) -- 58 20 100 % 5' 6\" (1.676 m) 215 lb (97.5 kg)       Physical Exam        Constitutional/General: Alert and oriented x3  Head: Normocephalic and atraumatic, no hematomas no lacerations or abrasions no ecchymosis noted on the scalp. Eyes: PERRL, EOMI, conjunctiva normal, sclera non icteric ,bilateral TMs normal  ENT:  Oropharynx clear, handling secretions, no trismus, no asymmetry of the posterior oropharynx or uvular edema  Neck: Supple, full ROM, no midline cervical spine tenderness  Respiratory: Lungs clear to auscultation bilaterally, no wheezes, rales, or rhonchi. Not in respiratory distress  Cardiovascular:  Regular rate. Regular rhythm. Musculoskeletal: Moves all extremities x 4. Warm and well perfused, no clubbing, no cyanosis, no edema. Capillary refill <3 seconds  Integument: skin warm and dry. No rashes. Neurologic: GCS 15, no focal deficits  Psychiatric: Normal Affect            DIAGNOSTIC RESULTS   LABS:    Labs Reviewed - No data to display    As interpreted by me, the above displayed labs are abnormal. All other labs obtained during this visit were within normal range or not returned as of this dictation. RADIOLOGY:   Non-plain film images such as CT, Ultrasound and MRI are read by the radiologist. Plain radiographic images are visualized and preliminarily interpreted by the ED Provider with the below findings:        Interpretation per the Radiologist below, if available at the time of this note:    CT HEAD WO CONTRAST   Final Result   No acute intracranial abnormality. No results found. No results found.     PROCEDURES   Unless otherwise noted below, none     Procedures      PAST MEDICAL HISTORY/Chronic Conditions Affecting Care      has a past medical history of Back pain, Hypertension, and Thyroid

## 2023-06-16 NOTE — ED NOTES
Patient to transfer self to [address] [facility],[address] for CT, CT notified.       Rashad Rollins LPN  34/29/34 [id number]

## 2023-06-16 NOTE — DISCHARGE INSTRUCTIONS
Go directly to the CT Department for a scan of your head. If negative  you will be discharged.  If any further symptoms develop, go to the Emergency Department

## 2023-06-16 NOTE — ED NOTES
Discharge instructions given over the phone by NP and patient discharged from [facility]  82/31/05 [id number]

## 2023-06-22 ENCOUNTER — OFFICE VISIT (OUTPATIENT)
Dept: BARIATRICS/WEIGHT MGMT | Age: 68
End: 2023-06-22
Payer: COMMERCIAL

## 2023-06-22 VITALS
TEMPERATURE: 97.1 F | HEART RATE: 65 BPM | SYSTOLIC BLOOD PRESSURE: 132 MMHG | HEIGHT: 63 IN | BODY MASS INDEX: 38.16 KG/M2 | DIASTOLIC BLOOD PRESSURE: 68 MMHG | WEIGHT: 215.4 LBS

## 2023-06-22 DIAGNOSIS — K59.04 CHRONIC IDIOPATHIC CONSTIPATION: ICD-10-CM

## 2023-06-22 DIAGNOSIS — E66.09 CLASS 2 OBESITY DUE TO EXCESS CALORIES WITHOUT SERIOUS COMORBIDITY WITH BODY MASS INDEX (BMI) OF 37.0 TO 37.9 IN ADULT: ICD-10-CM

## 2023-06-22 DIAGNOSIS — I10 ESSENTIAL HYPERTENSION: Primary | ICD-10-CM

## 2023-06-22 PROCEDURE — 1123F ACP DISCUSS/DSCN MKR DOCD: CPT | Performed by: INTERNAL MEDICINE

## 2023-06-22 PROCEDURE — 99214 OFFICE O/P EST MOD 30 MIN: CPT | Performed by: INTERNAL MEDICINE

## 2023-06-22 PROCEDURE — 3078F DIAST BP <80 MM HG: CPT | Performed by: INTERNAL MEDICINE

## 2023-06-22 PROCEDURE — 3074F SYST BP LT 130 MM HG: CPT | Performed by: INTERNAL MEDICINE

## 2023-06-22 PROCEDURE — 1036F TOBACCO NON-USER: CPT | Performed by: INTERNAL MEDICINE

## 2023-06-22 PROCEDURE — G8400 PT W/DXA NO RESULTS DOC: HCPCS | Performed by: INTERNAL MEDICINE

## 2023-06-22 PROCEDURE — G8417 CALC BMI ABV UP PARAM F/U: HCPCS | Performed by: INTERNAL MEDICINE

## 2023-06-22 PROCEDURE — G8428 CUR MEDS NOT DOCUMENT: HCPCS | Performed by: INTERNAL MEDICINE

## 2023-06-22 PROCEDURE — 1090F PRES/ABSN URINE INCON ASSESS: CPT | Performed by: INTERNAL MEDICINE

## 2023-06-22 PROCEDURE — 3017F COLORECTAL CA SCREEN DOC REV: CPT | Performed by: INTERNAL MEDICINE

## 2023-06-22 RX ORDER — PHENTERMINE HYDROCHLORIDE 37.5 MG/1
37.5 TABLET ORAL
Qty: 30 TABLET | Refills: 0 | Status: SHIPPED | OUTPATIENT
Start: 2023-06-22 | End: 2023-07-22

## 2023-06-22 RX ORDER — DOCUSATE SODIUM 50 MG/5ML
100 LIQUID ORAL DAILY
Qty: 300 ML | Refills: 0 | Status: SHIPPED | OUTPATIENT
Start: 2023-06-22

## 2023-06-22 NOTE — PROGRESS NOTES
CC -   Follow up of: HTN, has back and brinda right leg pain, Obesity    BACKGROUND -   Last visit: 5/16/23  First visit: 3/15/23    Obesity (all weight in lbs)  Began on and off all her life - (thyroid medicine started in 1996), brinda after marriage  Initial Ht 63.25\", Wt 232.0,  BMI 40.77  HS Grad wt 140-145   Lowest   wt 135   Highest  wt 265 (1991-93)  Pattern of wt gain: gradual ->up and down  Wt change past yr: ~-20-25 lbs  Most wt lost: unsure - ~35 lbs  Other diets attempted: intermittent fasting with walking, tried some diet plans in the past    Desire to lose weight: ---/10    Initial Diet:    Number of meals per day - 3    Number of snacks per day - 1    Meal volume - 8\" plate,  occasionally seconds    Fast food/convenience store - 1x/week    Restaurants (not fast food) - 2x/week   Sweets - 0d/week   Chips - 3-4d/week   Crackers/pretzels - 3-4d/week   Nuts - 3-4d/week - (combined 7d/wk)   Peanut Butter - 1-2d/week (1 tbsp on waffle)   Popcorn - 0d/week   Dried fruit - 0d/week   Whole fruit - 7d/week (banana daily, blueberry/apple 2-3d/wk)   Breakfast cereal - 0-1d/week -small cup of oatmeal   Granola/Protein/Energy bar - 0d/week   Sugar sweetened beverages - occ pop/soda <1x/wk gingerale, apple juice 2-3x/wk if not apple, coffee 18 oz/day no sugar with cream -  small cups 10 cups of regular coffeemate, ~1x/wk tea with sugar or 1 tsp honey, no energy drinks   Protein - No supplements   Fiber - No supplements    Mandarin orange with 100% juice or grapefruit sections with 100% juice daily. Wt effect of HR foods = 1400 Shade/wk = 200 Shade/d= 11.3% DEN = 21 lb/year. Initial Exercise:    [employer] - not currently, has foot pedal at home    Walking - 20+20 min 5d/wk at work, plus on weekends when weather is nice.     Running - no    Resistance - no    Aerobic class - pedals, see above     Sleep: ~7 hrs, rested mostly.  ______________________    [facility] -  Past Medical History:   Diagnosis Date    Back pain

## 2023-06-22 NOTE — PATIENT INSTRUCTIONS
Rules:  Continue calorie conscious diet and portion control  Limit sweets to one day per month  Limit chips/crackers/pretzels/nuts/popcorn to 150 emilie/day  Eliminate all sugar sweetened beverages (including fruit juice)  Limit restaurants (including fast food and food from a convenience store) to one time every two weeks while in town    Requirements:  Make sure protein intake is at least 65 grams per day  Make sure that fiber intake is at least 25 grams per day  Take one multivitamin every day    Targets:  Limit calorie intake to 1150 calories/day  Walk 30 minutes dailyor equivalent  Avoid eating 2 hours within bedtime. Phentermine:  Take phentermine 37.5 mg, one-half to one tablet daily as needed for appetite suppression. Take each dose 30-90 min before effect will be needed. While taking phentermine, check the Blood Pressure every morning and every evening. If the systolic BP is >393 mmHg, the diastolic BP is >64 mm/Hg or the heart rate is > 100 beats per minute, do not take phentermine that day. If the systolic BP is consistently >155 mmHg, the diastolic BP is consistently above 90 mm/Hg or the heart rate is consistently > 100 beats per minute, then stop taking phentermine altogether. If the systolic BP >440 mmHg or the diastolic BP is >324 mmHg (even if it is only once), then phentermine should be stopped altogether without proving that any of these are consistently elevated. Follow up in 1 month.

## 2023-08-10 ENCOUNTER — OFFICE VISIT (OUTPATIENT)
Dept: BARIATRICS/WEIGHT MGMT | Age: 68
End: 2023-08-10
Payer: COMMERCIAL

## 2023-08-10 VITALS
WEIGHT: 213.2 LBS | SYSTOLIC BLOOD PRESSURE: 144 MMHG | HEART RATE: 72 BPM | DIASTOLIC BLOOD PRESSURE: 77 MMHG | BODY MASS INDEX: 37.78 KG/M2 | TEMPERATURE: 97 F | HEIGHT: 63 IN

## 2023-08-10 DIAGNOSIS — E66.09 CLASS 2 OBESITY DUE TO EXCESS CALORIES WITHOUT SERIOUS COMORBIDITY WITH BODY MASS INDEX (BMI) OF 37.0 TO 37.9 IN ADULT: Primary | ICD-10-CM

## 2023-08-10 PROCEDURE — G8400 PT W/DXA NO RESULTS DOC: HCPCS | Performed by: INTERNAL MEDICINE

## 2023-08-10 PROCEDURE — 1123F ACP DISCUSS/DSCN MKR DOCD: CPT | Performed by: INTERNAL MEDICINE

## 2023-08-10 PROCEDURE — 99211 OFF/OP EST MAY X REQ PHY/QHP: CPT | Performed by: INTERNAL MEDICINE

## 2023-08-10 PROCEDURE — 1036F TOBACCO NON-USER: CPT | Performed by: INTERNAL MEDICINE

## 2023-08-10 PROCEDURE — G8428 CUR MEDS NOT DOCUMENT: HCPCS | Performed by: INTERNAL MEDICINE

## 2023-08-10 PROCEDURE — 3017F COLORECTAL CA SCREEN DOC REV: CPT | Performed by: INTERNAL MEDICINE

## 2023-08-10 PROCEDURE — G8417 CALC BMI ABV UP PARAM F/U: HCPCS | Performed by: INTERNAL MEDICINE

## 2023-08-10 PROCEDURE — 1090F PRES/ABSN URINE INCON ASSESS: CPT | Performed by: INTERNAL MEDICINE

## 2023-08-10 PROCEDURE — 99214 OFFICE O/P EST MOD 30 MIN: CPT | Performed by: INTERNAL MEDICINE

## 2023-08-10 RX ORDER — TOPIRAMATE 25 MG/1
TABLET ORAL
Qty: 60 TABLET | Refills: 1 | Status: SHIPPED | OUTPATIENT
Start: 2023-08-10

## 2023-08-10 NOTE — PATIENT INSTRUCTIONS
Breakfast -     one high protein shake                            + 20 grams of fiber. Do this by either eating 10 tablespoons of the original, plain Fiber One cereal every day or 4 tablespoons of wheat dextrin powder (Benefiber or a generic brand) every day. Work up to this amount slowly by starting with only one-eighth to one-fourth of the target amount and then adding another one-eighth to one-fourth every one or two weeks until reaching the target. Lunch -           one high protein shake                           + one a fat snack item     Dinner -          one frozen meal                           + one snack item     Shake options (<200 emilie, >25 grams/protein) :  Nectar, Pure Protein, Premier, Fairlife, Boost Max, Encore HQ Max, BeneProtein and [employer] (which is lactose-free) are milk-based options; Nectar, Premier Protein Clear, IsoPure Protein Drink, and Protein 2 O are water-based options; (Premier Protein Clear, the water-based option, comes in a 20 oz bottle with 20 grams of protein and 90 calories. So you have to drink three each day which increases the cost.)  (Disclaimer: Dietary supplements rarely have their listed ingredients and the amount of each verified by a third party other. Sometimes they give verification for their claims to be GMO and gluten free and to be organic.  However, even such verifications as these may still be untrustworthy.)     Fat snack options (<150 emilie, >11 grams of fat): 22 almonds or cashews, 1 1/2 tablespoon of a oil-based dressing or 4 tablespoons of Yuan dressing on a bed of salad greens, 1 1/2 tablespoons of peanut butter, 1 Cranberry [place] Encore HQ options (<100 emilie, no sweets): fruit, low fat/high protein Saint Daily yogurt, mozzarella cheese stick, nuts, salad with dressing, peanut butter, chips/crackers/pretzels     Frozen meal options (<350 emilie, <800 mg sodium):  Weight Watchers Smart Ones, Capptain, Healthy Choice, Adrienne's, Tali's, etc     Food

## 2023-08-10 NOTE — PROGRESS NOTES
day.    Topiramate:  Start Topiramate 25 mg. Take one tablet daily in the evenings for one week. If the appetite suppression is insufficient, then increase to two tablets daily. Follow up in about 8 weeks. Medication management: Topiramate    Marcia James MD  Internal Medicine/Obesity Medicine  8/10/2023.

## 2023-09-03 DIAGNOSIS — E66.09 CLASS 2 OBESITY DUE TO EXCESS CALORIES WITHOUT SERIOUS COMORBIDITY WITH BODY MASS INDEX (BMI) OF 37.0 TO 37.9 IN ADULT: ICD-10-CM

## 2023-09-07 RX ORDER — TOPIRAMATE 25 MG/1
TABLET ORAL
Qty: 60 TABLET | Refills: 1 | OUTPATIENT
Start: 2023-09-07

## 2024-06-04 ENCOUNTER — OFFICE VISIT (OUTPATIENT)
Dept: BARIATRICS/WEIGHT MGMT | Age: 69
End: 2024-06-04
Payer: COMMERCIAL

## 2024-06-04 VITALS
SYSTOLIC BLOOD PRESSURE: 135 MMHG | BODY MASS INDEX: 39.69 KG/M2 | WEIGHT: 224 LBS | HEART RATE: 62 BPM | DIASTOLIC BLOOD PRESSURE: 66 MMHG | TEMPERATURE: 97 F | HEIGHT: 63 IN

## 2024-06-04 DIAGNOSIS — E66.01 CLASS 3 SEVERE OBESITY DUE TO EXCESS CALORIES WITH SERIOUS COMORBIDITY AND BODY MASS INDEX (BMI) OF 45.0 TO 49.9 IN ADULT (HCC): ICD-10-CM

## 2024-06-04 DIAGNOSIS — I10 ESSENTIAL HYPERTENSION: Primary | ICD-10-CM

## 2024-06-04 PROCEDURE — 1090F PRES/ABSN URINE INCON ASSESS: CPT | Performed by: INTERNAL MEDICINE

## 2024-06-04 PROCEDURE — 3078F DIAST BP <80 MM HG: CPT | Performed by: INTERNAL MEDICINE

## 2024-06-04 PROCEDURE — 99211 OFF/OP EST MAY X REQ PHY/QHP: CPT

## 2024-06-04 PROCEDURE — G8417 CALC BMI ABV UP PARAM F/U: HCPCS | Performed by: INTERNAL MEDICINE

## 2024-06-04 PROCEDURE — G8428 CUR MEDS NOT DOCUMENT: HCPCS | Performed by: INTERNAL MEDICINE

## 2024-06-04 PROCEDURE — 1123F ACP DISCUSS/DSCN MKR DOCD: CPT | Performed by: INTERNAL MEDICINE

## 2024-06-04 PROCEDURE — G8400 PT W/DXA NO RESULTS DOC: HCPCS | Performed by: INTERNAL MEDICINE

## 2024-06-04 PROCEDURE — 3017F COLORECTAL CA SCREEN DOC REV: CPT | Performed by: INTERNAL MEDICINE

## 2024-06-04 PROCEDURE — 1036F TOBACCO NON-USER: CPT | Performed by: INTERNAL MEDICINE

## 2024-06-04 PROCEDURE — 99214 OFFICE O/P EST MOD 30 MIN: CPT | Performed by: INTERNAL MEDICINE

## 2024-06-04 PROCEDURE — 3075F SYST BP GE 130 - 139MM HG: CPT | Performed by: INTERNAL MEDICINE

## 2024-06-04 RX ORDER — PHENTERMINE HYDROCHLORIDE 37.5 MG/1
TABLET ORAL
Qty: 30 TABLET | Refills: 0 | Status: SHIPPED | OUTPATIENT
Start: 2024-06-04 | End: 2024-07-04

## 2024-06-04 NOTE — PATIENT INSTRUCTIONS
Rules:  Limit sweets to one day per month  Limit chips/crackers/pretzels/nuts/popcorn to 150 shade/day  Eliminate all sugar sweetened beverages (including fruit juice)  Limit restaurants (including fast food and food from a convenience store) to one time every two weeks while in town    Requirements:  Make sure protein intake is at least 70 grams per day (do not count protein every day; instead spot check your intake every 2-3 weeks and make sure what you think you are getting is close to accurate; consider using a protein shake if needed; these are in the pharmacy section of the stores, not the grocery section; Premier, Pure Protein and Fairlife are relatively inexpensive and taste good to most patients; other options are Nectar, Boost Max, Ensure Max, BeneProtein and GNC lean (which is lactose-free);   Nectar fruit, Premier Protein Clear, IsoPure Protein Drink, and Protein 2 O are water-based options; Quest (or StackBlaze, which is cheaper and is ordered on Amazon) and the [employer] Reef 1 protein bars can also be used, but have less protein in them ) (<200 Shade, >25 g protein) - (chicken, fish, turkey, egg white)  (Disclaimer: Dietary supplements rarely have their listed ingredients and the amount of each verified by a third party other. Sometimes they give verification for their claims to be GMO and gluten free and to be organic. However, even such verifications as these may still be untrustworthy.)  Make sure that fiber intake is at least 25 grams per day. Do this by either eating 12 tablespoons of the original, plain Fiber One cereal every day or 4 tablespoons of wheat dextrin powder (Benefiber or a generic brand) every day. Work up to this amount slowly by starting with only one-eighth to one-fourth of the target amount and then adding another one-eighth to one-fourth every one or two weeks until reaching the target.  Take one multivitamin every day    Targets:  Limit calorie intake to 1100 calories/day  Walk 30 minutes

## 2024-06-04 NOTE — PROGRESS NOTES
Weight reduction per plan below      Problem 2  - Obesity   HPI   - See above Background for description    Weight  Date    232.0  3/15/23   Phentermine #1    223.0  4/18/23   Phentermine #2    220.8  5/16/23   Phentermine #3    215.4  6/22/23   Phentermine continued    213.2  8/10/23   Topiramate    224.0  6/4/24     Phentermine restarted       Total weight change to date: -8.0 lbs.  Average daily energy variance:  3/15/2023 - 4/18/2023: -7.4 lbs (76307 Shade)/33 d = -785 Shade/d deficit.  4/18/2023 - 5/16/2023: -2.2 lbs (7700 Shade)/28 d = -275 Shade/d deficit.  5/16/2023 - 6/22/2023: -5.4 lbs (48729 Shade)/37 d = -511 Shade/d deficit.  6/22/2023 - 8/10/2023: -2.2 lbs (7700 Shade)/49 d = -157 Shade/d deficit.  8/10/2023 - 6/4/2024: +10.8 lbs (98810 Shade)/299 d = +126 Shade/d excess.    3/15/23 - 6/22/23: 232.0 ->215.4: 7.2% body weight loss.  5/16/23 - 8/10/23: 220.8 ->213.2: 3.4% body weight loss.    Patient's estimated daily energy need (DEN) = 1763 Shade/d = 13948 Shade/wk    Update:  Trying to do IF, but feels the choices have been so good per pt - potato chips  Sweets: not currently (more fruit currently)  SSB: none (zero sugar gingerales)  Restaurant food: not going out currently - cooking at home  Appetite suppressant: She never took Topiramate. Would like to restart Phentermine for appetite suppression  Home BP monitoring: bid, mostly wnl (does not take medicine if SBP >145 mmHg).  Protein: currently not taking protein supplement, recently bought premier shake in the last month - smt has it during lunch.   Fiber:  vegetables (bloated smt with fiber one), refried beans, salad, hummus (plain), avocado  Water: ~70 oz/day or more  Activities: walking 1 hr 10 min/day 5d/wk, yard work, planning to do planks.    Assessment  - Uncontrolled -> weight regain over the last 10 months.  Recommended monitoring calories, and maintain a low calorie diet. Would like to restart Phentermine as it had helped with appetite suppression in the past,

## 2024-07-02 ENCOUNTER — OFFICE VISIT (OUTPATIENT)
Dept: BARIATRICS/WEIGHT MGMT | Age: 69
End: 2024-07-02
Payer: COMMERCIAL

## 2024-07-02 VITALS
DIASTOLIC BLOOD PRESSURE: 72 MMHG | TEMPERATURE: 98 F | HEIGHT: 63 IN | SYSTOLIC BLOOD PRESSURE: 142 MMHG | WEIGHT: 212.4 LBS | HEART RATE: 75 BPM | BODY MASS INDEX: 37.63 KG/M2

## 2024-07-02 DIAGNOSIS — I10 ESSENTIAL HYPERTENSION: Primary | ICD-10-CM

## 2024-07-02 DIAGNOSIS — E66.01 CLASS 3 SEVERE OBESITY DUE TO EXCESS CALORIES WITH SERIOUS COMORBIDITY AND BODY MASS INDEX (BMI) OF 45.0 TO 49.9 IN ADULT (HCC): ICD-10-CM

## 2024-07-02 PROCEDURE — 99214 OFFICE O/P EST MOD 30 MIN: CPT | Performed by: INTERNAL MEDICINE

## 2024-07-02 PROCEDURE — 99211 OFF/OP EST MAY X REQ PHY/QHP: CPT | Performed by: INTERNAL MEDICINE

## 2024-07-02 PROCEDURE — 3078F DIAST BP <80 MM HG: CPT | Performed by: INTERNAL MEDICINE

## 2024-07-02 PROCEDURE — 1123F ACP DISCUSS/DSCN MKR DOCD: CPT | Performed by: INTERNAL MEDICINE

## 2024-07-02 PROCEDURE — 3077F SYST BP >= 140 MM HG: CPT | Performed by: INTERNAL MEDICINE

## 2024-07-02 PROCEDURE — 1036F TOBACCO NON-USER: CPT | Performed by: INTERNAL MEDICINE

## 2024-07-02 PROCEDURE — G8427 DOCREV CUR MEDS BY ELIG CLIN: HCPCS | Performed by: INTERNAL MEDICINE

## 2024-07-02 PROCEDURE — 3017F COLORECTAL CA SCREEN DOC REV: CPT | Performed by: INTERNAL MEDICINE

## 2024-07-02 PROCEDURE — G8417 CALC BMI ABV UP PARAM F/U: HCPCS | Performed by: INTERNAL MEDICINE

## 2024-07-02 PROCEDURE — 1090F PRES/ABSN URINE INCON ASSESS: CPT | Performed by: INTERNAL MEDICINE

## 2024-07-02 PROCEDURE — G8400 PT W/DXA NO RESULTS DOC: HCPCS | Performed by: INTERNAL MEDICINE

## 2024-07-02 RX ORDER — PHENTERMINE HYDROCHLORIDE 37.5 MG/1
TABLET ORAL
Qty: 30 TABLET | Refills: 0 | Status: SHIPPED | OUTPATIENT
Start: 2024-07-02 | End: 2024-08-01

## 2024-07-02 NOTE — PROGRESS NOTES
snack options (<150 emilie, >11 grams of fat): 22 almonds or cashews, 1 1/2 tablespoon of a oil-based dressing or 4 tablespoons of Italian dressing on a bed of salad greens, 1 1/2 tablespoons of peanut butter, 1 Cranberry [place] Kind Bar     Snack options (<100 emilie, no sweets): fruit, low fat/high protein Greek yogurt, mozzarella cheese stick, nuts, salad with dressing, peanut butter, chips/crackers/pretzels     Frozen meal options (<350 emilie, <800 mg sodium):  Weight Watchers Smart Ones, Lean Cuisine, Healthy Choice, Adrienne's, Tali's, etc     Food substitutes for the shakes:  4 oz of baked, grilled or broiled chicken, turkey or fish, 8 egg whites    Phentermine:  Take phentermine 37.5 mg, one-half to one tablet daily as needed for appetite suppression. Take each dose 30-90 min before effect will be needed.    While taking phentermine, check the Blood Pressure every morning and every evening.     If the systolic BP is >155 mmHg, the diastolic BP is >90 mm/Hg or the heart rate is > 100 beats per minute, do not take phentermine that day.    If the systolic BP is consistently >155 mmHg, the diastolic BP is consistently above 90 mm/Hg or the heart rate is consistently > 100 beats per minute, then stop taking phentermine altogether.    If the systolic BP >170 mmHg or the diastolic BP is >100 mmHg (even if it is only once), then phentermine should be stopped altogether without proving that any of these are consistently elevated.    Follow up in 1 month.    Medication management: Phentermine    Jamila Kramer MD  Internal Medicine/Obesity Medicine  7/2/2024.

## 2024-07-02 NOTE — PATIENT INSTRUCTIONS
Rules:  Limit sweets to one day per month  Limit chips/crackers/pretzels/nuts/popcorn to 150 shade/day  Eliminate all sugar sweetened beverages (including fruit juice)  Limit restaurants (including fast food and food from a convenience store) to one time every two weeks while in town    Requirements:  Make sure protein intake is at least 70 grams per day (do not count protein every day; instead spot check your intake every 2-3 weeks and make sure what you think you are getting is close to accurate; consider using a protein shake if needed; these are in the pharmacy section of the stores, not the grocery section; Premier, Pure Protein and Fairlife are relatively inexpensive and taste good to most patients; other options are Nectar, Boost Max, Ensure Max, BeneProtein and GNC lean (which is lactose-free);   Nectar fruit, Premier Protein Clear, IsoPure Protein Drink, and Protein 2 O are water-based options; Quest (or [employer], which is cheaper and is ordered on Amazon) and the WalkSource 1 protein bars can also be used, but have less protein in them ) (<200 Shade, >25 g protein) - (chicken, fish, turkey, egg white)  (Disclaimer: Dietary supplements rarely have their listed ingredients and the amount of each verified by a third party other. Sometimes they give verification for their claims to be GMO and gluten free and to be organic. However, even such verifications as these may still be untrustworthy.)  Make sure that fiber intake is at least 25 grams per day. Do this by either eating 12 tablespoons of the original, plain Fiber One cereal every day or 4 tablespoons of wheat dextrin powder (Benefiber or a generic brand) every day. Work up to this amount slowly by starting with only one-eighth to one-fourth of the target amount and then adding another one-eighth to one-fourth every one or two weeks until reaching the target.  Take one multivitamin every day    Targets:  Limit calorie intake to 1100 calories/day  Walk 30 minutes

## 2024-08-02 ENCOUNTER — OFFICE VISIT (OUTPATIENT)
Dept: BARIATRICS/WEIGHT MGMT | Age: 69
End: 2024-08-02
Payer: COMMERCIAL

## 2024-08-02 VITALS
TEMPERATURE: 97 F | DIASTOLIC BLOOD PRESSURE: 76 MMHG | HEIGHT: 63 IN | BODY MASS INDEX: 36.64 KG/M2 | WEIGHT: 206.8 LBS | HEART RATE: 65 BPM | SYSTOLIC BLOOD PRESSURE: 157 MMHG

## 2024-08-02 DIAGNOSIS — E66.01 CLASS 2 SEVERE OBESITY DUE TO EXCESS CALORIES WITH SERIOUS COMORBIDITY AND BODY MASS INDEX (BMI) OF 37.0 TO 37.9 IN ADULT (HCC): ICD-10-CM

## 2024-08-02 DIAGNOSIS — I10 ACCELERATED ESSENTIAL HYPERTENSION: Primary | ICD-10-CM

## 2024-08-02 PROBLEM — G62.9 NEUROPATHY: Status: ACTIVE | Noted: 2024-08-02

## 2024-08-02 PROBLEM — M25.511 PAIN OF RIGHT SHOULDER REGION: Status: ACTIVE | Noted: 2024-08-02

## 2024-08-02 PROBLEM — J06.9 UPPER RESPIRATORY TRACT INFECTION: Status: ACTIVE | Noted: 2024-08-02

## 2024-08-02 PROBLEM — M79.602 PAIN OF LEFT UPPER EXTREMITY: Status: ACTIVE | Noted: 2024-08-02

## 2024-08-02 PROBLEM — R10.9 ABDOMINAL PAIN: Status: ACTIVE | Noted: 2024-08-02

## 2024-08-02 PROBLEM — H01.009 BLEPHARITIS: Status: ACTIVE | Noted: 2024-08-02

## 2024-08-02 PROBLEM — R73.03 PREDIABETES: Status: ACTIVE | Noted: 2024-08-02

## 2024-08-02 PROBLEM — K57.90 DIVERTICULAR DISEASE: Status: ACTIVE | Noted: 2024-08-02

## 2024-08-02 PROBLEM — E66.9 SIMPLE OBESITY: Status: ACTIVE | Noted: 2024-08-02

## 2024-08-02 PROBLEM — E78.5 HYPERLIPIDEMIA: Status: ACTIVE | Noted: 2024-08-02

## 2024-08-02 PROBLEM — E03.9 ACQUIRED HYPOTHYROIDISM: Status: ACTIVE | Noted: 2024-08-02

## 2024-08-02 PROCEDURE — 99214 OFFICE O/P EST MOD 30 MIN: CPT | Performed by: CLINICAL NURSE SPECIALIST

## 2024-08-02 PROCEDURE — G8417 CALC BMI ABV UP PARAM F/U: HCPCS | Performed by: CLINICAL NURSE SPECIALIST

## 2024-08-02 PROCEDURE — 3077F SYST BP >= 140 MM HG: CPT | Performed by: CLINICAL NURSE SPECIALIST

## 2024-08-02 PROCEDURE — G8428 CUR MEDS NOT DOCUMENT: HCPCS | Performed by: CLINICAL NURSE SPECIALIST

## 2024-08-02 PROCEDURE — 1123F ACP DISCUSS/DSCN MKR DOCD: CPT | Performed by: CLINICAL NURSE SPECIALIST

## 2024-08-02 PROCEDURE — 3078F DIAST BP <80 MM HG: CPT | Performed by: CLINICAL NURSE SPECIALIST

## 2024-08-02 PROCEDURE — G8400 PT W/DXA NO RESULTS DOC: HCPCS | Performed by: CLINICAL NURSE SPECIALIST

## 2024-08-02 PROCEDURE — 1090F PRES/ABSN URINE INCON ASSESS: CPT | Performed by: CLINICAL NURSE SPECIALIST

## 2024-08-02 PROCEDURE — 1036F TOBACCO NON-USER: CPT | Performed by: CLINICAL NURSE SPECIALIST

## 2024-08-02 PROCEDURE — 3017F COLORECTAL CA SCREEN DOC REV: CPT | Performed by: CLINICAL NURSE SPECIALIST

## 2024-08-02 RX ORDER — PHENTERMINE HYDROCHLORIDE 37.5 MG/1
TABLET ORAL
Qty: 30 TABLET | Refills: 0 | Status: SHIPPED | OUTPATIENT
Start: 2024-08-02 | End: 2024-09-02

## 2024-08-02 NOTE — PATIENT INSTRUCTIONS
Plan -     Rules:  Limit sweets to one day per month  Limit chips/crackers/pretzels/nuts/popcorn to 150 shade/day  Eliminate all sugar sweetened beverages (including fruit juice)  Limit restaurants (including fast food and food from a convenience store) to one time every two weeks while in town    Requirements:  Make sure protein intake is at least 70 grams per day (do not count protein every day; instead spot check your intake every 2-3 weeks and make sure what you think you are getting is close to accurate; consider using a protein shake if needed; these are in the pharmacy section of the stores, not the grocery section; Premier, Pure Protein and Fairlife are relatively inexpensive and taste good to most patients; other options are Nectar, Boost Max, Ensure Max, BeneProtein and GNC lean (which is lactose-free);   Nectar fruit, Premier Protein Clear, IsoPure Protein Drink, and Protein 2 O are water-based options; Quest (or Soulstice Endeavors, which is cheaper and is ordered on Amazon) and the [employer] 1 protein bars can also be used, but have less protein in them ) (<200 Shade, >25 g protein) - (chicken, fish, turkey, egg white)    (Disclaimer: Dietary supplements rarely have their listed ingredients and the amount of each verified by a third party other. Sometimes they give verification for their claims to be GMO and gluten free and to be organic. However, even such verifications as these may still be untrustworthy.)    Make sure that fiber intake is at least 25 grams per day. Do this by either eating 12 tablespoons of the original, plain Fiber One cereal every day or 4 tablespoons of wheat dextrin powder (Benefiber or a generic brand) every day. Work up to this amount slowly by starting with only one-eighth to one-fourth of the target amount and then adding another one-eighth to one-fourth every one or two weeks until reaching the target.  Take one multivitamin every day    Targets:  Limit calorie intake to 1100 calories/day  Walk

## 2024-08-02 NOTE — PROGRESS NOTES
LUMPECTOMY Left     2 lumpectomy - was benign     SECTION      2 C-sections       Prior to Admission medications    Medication Sig Start Date End Date Taking? Authorizing Provider   docusate sodium (COLACE) 150 MG/15ML liquid Take 10 mLs by mouth daily 23   Jamila Kramer MD   SYNTHROID 100 MCG tablet Take 100 mcg by mouth daily 3/7/23   Dean Cristina MD   losartan (COZAAR) 50 MG tablet Take 50 mg by mouth daily    Dean Cristina MD   gabapentin (NEURONTIN) 300 MG capsule Take 300 mg by mouth nightly    ProviderDean MD   Vitamin D ?2000 IU daily.    23: was taking mvi chewy, planning to change.    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    ROS -  Card - no CP  GI - no N/V/D/C, sometimes has to take Miralax.    PE -  Gen : There were no vitals taken for this visit.   WN, WD, NAD  Lung: Nml resp effort, CTA B/L  Heart:  RRR w/o MGR, no LE pitting edema b/l  Psych: Normal mood   Full affect  Neuro: Moves all ext well  ______________________    HISTORY & ASSESSMENT/PLAN -     Problem 1 - Hypertension   HPI   - BP  157/76 today, compliant with medication, patient asymptomatic.  Assessment  - high today, usu controlled  Plan   - Continue medication, monitor. Weight reduction can help de-escalate dose of anti-hypertensive. Weight reduction per plan below    Problem 2  - Obesity   HPI   - See above Background for description    Weight  Date    232.0  3/15/23   Phentermine #1    223.0  23   Phentermine #2    220.8  23   Phentermine #3    215.4  23   Phentermine continued    213.2  8/10/23   Topiramate    224.0  24     Phentermine restarted     212.4  24     Phentermine #2    206.8   24      Phentermine #

## 2024-08-30 ENCOUNTER — HOSPITAL ENCOUNTER (OUTPATIENT)
Dept: GENERAL RADIOLOGY | Age: 69
End: 2024-08-30
Payer: COMMERCIAL

## 2024-08-30 ENCOUNTER — HOSPITAL ENCOUNTER (OUTPATIENT)
Age: 69
Discharge: HOME | End: 2024-08-30
Payer: COMMERCIAL

## 2024-08-30 DIAGNOSIS — R10.9 RECURRING ABDOMINAL PAIN: ICD-10-CM

## 2024-08-30 PROCEDURE — 74018 RADEX ABDOMEN 1 VIEW: CPT

## 2024-09-04 ENCOUNTER — OFFICE VISIT (OUTPATIENT)
Dept: BARIATRICS/WEIGHT MGMT | Age: 69
End: 2024-09-04
Payer: COMMERCIAL

## 2024-09-04 VITALS
WEIGHT: 201.2 LBS | SYSTOLIC BLOOD PRESSURE: 105 MMHG | HEIGHT: 63 IN | HEART RATE: 62 BPM | DIASTOLIC BLOOD PRESSURE: 64 MMHG | TEMPERATURE: 97.4 F | BODY MASS INDEX: 35.65 KG/M2

## 2024-09-04 DIAGNOSIS — E66.01 CLASS 2 SEVERE OBESITY DUE TO EXCESS CALORIES WITH SERIOUS COMORBIDITY AND BODY MASS INDEX (BMI) OF 35.0 TO 35.9 IN ADULT (HCC): ICD-10-CM

## 2024-09-04 DIAGNOSIS — I10 ESSENTIAL HYPERTENSION: Primary | ICD-10-CM

## 2024-09-04 PROCEDURE — 1123F ACP DISCUSS/DSCN MKR DOCD: CPT | Performed by: INTERNAL MEDICINE

## 2024-09-04 PROCEDURE — G8428 CUR MEDS NOT DOCUMENT: HCPCS | Performed by: INTERNAL MEDICINE

## 2024-09-04 PROCEDURE — 99211 OFF/OP EST MAY X REQ PHY/QHP: CPT | Performed by: INTERNAL MEDICINE

## 2024-09-04 PROCEDURE — 3017F COLORECTAL CA SCREEN DOC REV: CPT | Performed by: INTERNAL MEDICINE

## 2024-09-04 PROCEDURE — 1036F TOBACCO NON-USER: CPT | Performed by: INTERNAL MEDICINE

## 2024-09-04 PROCEDURE — G8417 CALC BMI ABV UP PARAM F/U: HCPCS | Performed by: INTERNAL MEDICINE

## 2024-09-04 PROCEDURE — 3074F SYST BP LT 130 MM HG: CPT | Performed by: INTERNAL MEDICINE

## 2024-09-04 PROCEDURE — G8400 PT W/DXA NO RESULTS DOC: HCPCS | Performed by: INTERNAL MEDICINE

## 2024-09-04 PROCEDURE — 99214 OFFICE O/P EST MOD 30 MIN: CPT | Performed by: INTERNAL MEDICINE

## 2024-09-04 PROCEDURE — 3078F DIAST BP <80 MM HG: CPT | Performed by: INTERNAL MEDICINE

## 2024-09-04 PROCEDURE — 1090F PRES/ABSN URINE INCON ASSESS: CPT | Performed by: INTERNAL MEDICINE

## 2024-09-04 RX ORDER — POLYETHYLENE GLYCOL 3350 17 G/17G
17 POWDER, FOR SOLUTION ORAL EVERY OTHER DAY
COMMUNITY

## 2024-09-04 RX ORDER — PHENTERMINE HYDROCHLORIDE 37.5 MG/1
TABLET ORAL
Qty: 30 TABLET | Refills: 0 | Status: SHIPPED | OUTPATIENT
Start: 2024-09-04 | End: 2024-10-04

## 2024-09-04 NOTE — PROGRESS NOTES
- was benign     SECTION      2 C-sections       Prior to Admission medications    Medication Sig Start Date End Date Taking? Authorizing Provider   polyethylene glycol (GLYCOLAX) 17 GM/SCOOP powder Take 17 g by mouth every other day   Yes Dean Cristina MD   phentermine (ADIPEX-P) 37.5 MG tablet Take half to one tablet a day. 9/4/24 10/4/24 Yes Jamila Kramer MD   docusate sodium (COLACE) 150 MG/15ML liquid Take 10 mLs by mouth daily 23   Jamila Kramer MD   SYNTHROID 100 MCG tablet Take 100 mcg by mouth daily 3/7/23   Dean Critsina MD   losartan (COZAAR) 50 MG tablet Take 50 mg by mouth daily    Dean Cristina MD   gabapentin (NEURONTIN) 300 MG capsule Take 300 mg by mouth nightly    Dean Cristina MD   Vitamin D ?2000 IU daily.    23: was taking mvi chewy, planning to change.    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    ROS -  Card - no CP  GI - no N/V/D/C, sometimes has to take Miralax.    PE -  Gen : /64 (Site: Left Upper Arm, Position: Sitting, Cuff Size: Large Adult)   Pulse 62   Temp 97.4 °F (36.3 °C) (Temporal)   Ht 1.607 m (5' 3.25\")   Wt 91.3 kg (201 lb 3.2 oz)   BMI 35.36 kg/m²    WN, WD, NAD  Lung: Nml resp effort, CTA B/L  Heart:  RRR w/o MGR, no LE pitting edema b/l  Psych: Normal mood   Full affect  Neuro: Moves all ext well  ______________________    HISTORY & ASSESSMENT/PLAN -     Problem 1 - Hypertension   HPI   - /64 today, compliant with medication, patient asymptomatic.  Assessment  - Controlled  Plan   - Continue medication, monitor. Weight reduction can help de-escalate dose of anti-hypertensive. Weight reduction per plan below      Problem 2  - Obesity   HPI   - See above

## 2024-09-04 NOTE — PATIENT INSTRUCTIONS
Breakfast -     one high protein shake                            + 20 grams of fiber. Do this by either eating 10 tablespoons of the original, plain Fiber One cereal every day or 4 tablespoons of wheat dextrin powder (Benefiber or a generic brand) every day. Work up to this amount slowly by starting with only one-eighth to one-fourth of the target amount and then adding another one-eighth to one-fourth every one or two weeks until reaching the target.     Lunch -           one high protein shake                           + one a fat snack item     Dinner -          one frozen meal                           + one snack item     Shake options (<200 emilie, >25 grams/protein) :  Nectar, Pure Protein, Premier, Fairlife, Boost Max, Ensure Max, BeneProtein and GNC lean (which is lactose-free) are milk-based options; Nectar, Premier Protein Clear, IsoPure Protein Drink, and Protein 2 O are water-based options; (Premier Protein Clear, the water-based option, comes in a 20 oz bottle with 20 grams of protein and 90 calories. So you have to drink three each day which increases the cost.)  (Disclaimer: Dietary supplements rarely have their listed ingredients and the amount of each verified by a third party other. Sometimes they give verification for their claims to be GMO and gluten free and to be organic. However, even such verifications as these may still be untrustworthy.)     Fat snack options (<150 emilie, >11 grams of fat): 22 almonds or cashews, 1 1/2 tablespoon of a oil-based dressing or 4 tablespoons of Italian dressing on a bed of salad greens, 1 1/2 tablespoons of peanut butter, 1 Cranberry [place] Kind Bar     Snack options (<100 emilie, no sweets): fruit, low fat/high protein Greek yogurt, mozzarella cheese stick, nuts, salad with dressing, peanut butter, chips/crackers/pretzels     Frozen meal options (<350 emilie, <800 mg sodium):  Weight Watchers Smart Ones, Lean Cuisine, Healthy Choice, Adrienne's, Tali's, etc     Food

## 2024-10-07 ENCOUNTER — OFFICE VISIT (OUTPATIENT)
Dept: BARIATRICS/WEIGHT MGMT | Age: 69
End: 2024-10-07
Payer: COMMERCIAL

## 2024-10-07 VITALS
HEIGHT: 63 IN | BODY MASS INDEX: 35.58 KG/M2 | WEIGHT: 200.8 LBS | TEMPERATURE: 97.3 F | HEART RATE: 63 BPM | SYSTOLIC BLOOD PRESSURE: 128 MMHG | DIASTOLIC BLOOD PRESSURE: 58 MMHG

## 2024-10-07 DIAGNOSIS — I10 ESSENTIAL HYPERTENSION: Primary | ICD-10-CM

## 2024-10-07 DIAGNOSIS — E66.812 CLASS 2 SEVERE OBESITY DUE TO EXCESS CALORIES WITH SERIOUS COMORBIDITY AND BODY MASS INDEX (BMI) OF 35.0 TO 35.9 IN ADULT: ICD-10-CM

## 2024-10-07 DIAGNOSIS — E66.01 CLASS 2 SEVERE OBESITY DUE TO EXCESS CALORIES WITH SERIOUS COMORBIDITY AND BODY MASS INDEX (BMI) OF 35.0 TO 35.9 IN ADULT: ICD-10-CM

## 2024-10-07 PROCEDURE — G8428 CUR MEDS NOT DOCUMENT: HCPCS | Performed by: INTERNAL MEDICINE

## 2024-10-07 PROCEDURE — G8484 FLU IMMUNIZE NO ADMIN: HCPCS | Performed by: INTERNAL MEDICINE

## 2024-10-07 PROCEDURE — G8417 CALC BMI ABV UP PARAM F/U: HCPCS | Performed by: INTERNAL MEDICINE

## 2024-10-07 PROCEDURE — 3017F COLORECTAL CA SCREEN DOC REV: CPT | Performed by: INTERNAL MEDICINE

## 2024-10-07 PROCEDURE — 1090F PRES/ABSN URINE INCON ASSESS: CPT | Performed by: INTERNAL MEDICINE

## 2024-10-07 PROCEDURE — 99211 OFF/OP EST MAY X REQ PHY/QHP: CPT | Performed by: INTERNAL MEDICINE

## 2024-10-07 PROCEDURE — 99214 OFFICE O/P EST MOD 30 MIN: CPT | Performed by: INTERNAL MEDICINE

## 2024-10-07 PROCEDURE — G8400 PT W/DXA NO RESULTS DOC: HCPCS | Performed by: INTERNAL MEDICINE

## 2024-10-07 PROCEDURE — 3078F DIAST BP <80 MM HG: CPT | Performed by: INTERNAL MEDICINE

## 2024-10-07 PROCEDURE — 1123F ACP DISCUSS/DSCN MKR DOCD: CPT | Performed by: INTERNAL MEDICINE

## 2024-10-07 PROCEDURE — 1036F TOBACCO NON-USER: CPT | Performed by: INTERNAL MEDICINE

## 2024-10-07 PROCEDURE — 3074F SYST BP LT 130 MM HG: CPT | Performed by: INTERNAL MEDICINE

## 2024-10-07 RX ORDER — PHENTERMINE HYDROCHLORIDE 37.5 MG/1
TABLET ORAL
Qty: 30 TABLET | Refills: 0 | Status: SHIPPED | OUTPATIENT
Start: 2024-10-07 | End: 2024-11-06

## 2024-10-07 NOTE — PATIENT INSTRUCTIONS
Rules:  Limit sweets to one day per month  Limit chips/crackers/pretzels/nuts/popcorn to 150 emilie/day  Eliminate all sugar sweetened beverages (including fruit juice)  Limit restaurants (including fast food and food from a convenience store) to one time every two weeks while in town    Requirements:  Make sure protein intake is at least 65 grams per day  Make sure that fiber intake is at least 25 grams per day  Take one multivitamin every day    Targets:  Limit calorie intake to 1150 calories/day  Walk 30 minutes dailyor equivalent  Avoid eating 2 hours within bedtime.     Tips:  Do not eat outside of the dining room or the kitchen  Do not eat while watching TV, videos, working on the computer or using a smart phone  Do not eat food out of a multi-serving bag or container.     Low calorie non-starchy vegetables:  Food (per 100 g) Calories Fibers T. Carbohydrates Protein Fat Sodium (mg) Potassium (mg)   Green Bell Peppers 10 1.7 4.6 0.9 0.2 3 175   Cucumbers 15 0.5 3.6 0.7 0.1 2 147   Celery 16 1.6 3 0.7 0.2 80 260   Tomatoes 18 1.2 3.9 0.9 0.2 5 237   Carrots 41 2.8 10 0.9 0.2 69 320   Cabbage 25 2.5 6 1.3 0.1 18 170   Broccoli 35 3.3 7.2 2.4 0.4 41 293   Cauliflower 23 2.3 4.1 1.8 0.5 15 142   Iceberg Lettuce 14 1.2 3 0.9 0.1 10 141   Kale 28 2 5.6 1.9 0.4 23 228   Brussel Sprouts 43 3.8 9 3.4 0.3 25 389   Spinach 23 2.4 3.8 3 0.3 70 466   Zucchini 15 1 2.7 1.1 0.4 3 264   Mushroom 28 2.2 5.3 2.2 0.5 2 356   Asparagus 22 2 4.1 2.4 0.2 14 224   Green Beans 35 3.2 7.9 1.9 0.3 1 146   Eggplant 35 2.5 8.7 0.8 0.2 1 123     Phentermine:  Take phentermine 37.5 mg, one-half to one tablet daily as needed for appetite suppression. Take each dose 30-90 min before effect will be needed.    While taking phentermine, check the Blood Pressure every morning and every evening.     If the systolic BP is >155 mmHg, the diastolic BP is >90 mm/Hg or the heart rate is > 100 beats per minute, do not take phentermine that day.    If the

## 2024-10-07 NOTE — PROGRESS NOTES
lumpectomy - was benign     SECTION      2 C-sections       Prior to Admission medications    Medication Sig Start Date End Date Taking? Authorizing Provider   phentermine (ADIPEX-P) 37.5 MG tablet Take half to one tablet a day. 10/7/24 11/6/24 Yes Jamila Kramer MD   polyethylene glycol (GLYCOLAX) 17 GM/SCOOP powder Take 17 g by mouth every other day    Dean Cristina MD   docusate sodium (COLACE) 150 MG/15ML liquid Take 10 mLs by mouth daily 23   Jamila Kramer MD   SYNTHROID 100 MCG tablet Take 100 mcg by mouth daily 3/7/23   Dean Cristina MD   losartan (COZAAR) 50 MG tablet Take 50 mg by mouth daily    Dean Cristina MD   gabapentin (NEURONTIN) 300 MG capsule Take 300 mg by mouth nightly    ProviderDean MD   Vitamin D ?2000 IU daily.    23: was taking mvi chewy, planning to change.    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    ROS -  Card - no CP  GI - no N/V/D/C, sometimes has to take Miralax.    PE -  Gen : BP (!) 128/58 (Site: Left Upper Arm, Position: Sitting, Cuff Size: Large Adult)   Pulse 63   Temp 97.3 °F (36.3 °C) (Temporal)   Ht 1.607 m (5' 3.25\")   Wt 91.1 kg (200 lb 12.8 oz)   BMI 35.29 kg/m²    WN, WD, NAD  Lung: Nml resp effort, CTA B/L  Heart:  RRR w/o MGR, no LE pitting edema b/l  Psych: Normal mood   Full affect  Neuro: Moves all ext well  ______________________    HISTORY & ASSESSMENT/PLAN -     Problem 1 - Hypertension   HPI   - BP (!) 128/58 today, compliant with medication, patient asymptomatic.  Assessment  - Controlled  Plan   - Continue medication, monitor. Weight reduction can help de-escalate dose of anti-hypertensive. Weight reduction per plan below      Problem 2  - Obesity   HPI   -

## 2024-11-04 ENCOUNTER — OFFICE VISIT (OUTPATIENT)
Dept: BARIATRICS/WEIGHT MGMT | Age: 69
End: 2024-11-04
Payer: COMMERCIAL

## 2024-11-04 VITALS
BODY MASS INDEX: 35.01 KG/M2 | DIASTOLIC BLOOD PRESSURE: 72 MMHG | WEIGHT: 197.6 LBS | HEART RATE: 61 BPM | TEMPERATURE: 97.1 F | HEIGHT: 63 IN | SYSTOLIC BLOOD PRESSURE: 143 MMHG

## 2024-11-04 DIAGNOSIS — I10 ESSENTIAL HYPERTENSION: Primary | ICD-10-CM

## 2024-11-04 DIAGNOSIS — E66.09 CLASS 1 OBESITY DUE TO EXCESS CALORIES WITH SERIOUS COMORBIDITY AND BODY MASS INDEX (BMI) OF 34.0 TO 34.9 IN ADULT: ICD-10-CM

## 2024-11-04 DIAGNOSIS — E66.811 CLASS 1 OBESITY DUE TO EXCESS CALORIES WITH SERIOUS COMORBIDITY AND BODY MASS INDEX (BMI) OF 34.0 TO 34.9 IN ADULT: ICD-10-CM

## 2024-11-04 PROCEDURE — G8400 PT W/DXA NO RESULTS DOC: HCPCS | Performed by: INTERNAL MEDICINE

## 2024-11-04 PROCEDURE — 3077F SYST BP >= 140 MM HG: CPT | Performed by: INTERNAL MEDICINE

## 2024-11-04 PROCEDURE — 99213 OFFICE O/P EST LOW 20 MIN: CPT | Performed by: INTERNAL MEDICINE

## 2024-11-04 PROCEDURE — G8484 FLU IMMUNIZE NO ADMIN: HCPCS | Performed by: INTERNAL MEDICINE

## 2024-11-04 PROCEDURE — 3017F COLORECTAL CA SCREEN DOC REV: CPT | Performed by: INTERNAL MEDICINE

## 2024-11-04 PROCEDURE — G8417 CALC BMI ABV UP PARAM F/U: HCPCS | Performed by: INTERNAL MEDICINE

## 2024-11-04 PROCEDURE — 1036F TOBACCO NON-USER: CPT | Performed by: INTERNAL MEDICINE

## 2024-11-04 PROCEDURE — 1123F ACP DISCUSS/DSCN MKR DOCD: CPT | Performed by: INTERNAL MEDICINE

## 2024-11-04 PROCEDURE — 3078F DIAST BP <80 MM HG: CPT | Performed by: INTERNAL MEDICINE

## 2024-11-04 PROCEDURE — 99211 OFF/OP EST MAY X REQ PHY/QHP: CPT

## 2024-11-04 PROCEDURE — G8428 CUR MEDS NOT DOCUMENT: HCPCS | Performed by: INTERNAL MEDICINE

## 2024-11-04 PROCEDURE — 1090F PRES/ABSN URINE INCON ASSESS: CPT | Performed by: INTERNAL MEDICINE

## 2024-11-04 NOTE — PATIENT INSTRUCTIONS
Rules:  Limit sweets to one day per month  Limit chips/crackers/pretzels/nuts/popcorn to 150 emilie/day  Eliminate all sugar sweetened beverages (including fruit juice)  Limit restaurants (including fast food and food from a convenience store) to one time every two weeks while in town    Requirements:  Make sure protein intake is at least 65 grams per day  Make sure that fiber intake is at least 25 grams per day  Take one multivitamin every day    Targets:  Limit calorie intake to 1150 calories/day  Walk 30 minutes dailyor equivalent  Avoid eating 2 hours within bedtime.     Tips:  Do not eat outside of the dining room or the kitchen  Do not eat while watching TV, videos, working on the computer or using a smart phone  Do not eat food out of a multi-serving bag or container.     Follow up within the next one week (by 11/11/2024) for weight recheck.  
Clear bilaterally, pupils equal, round and reactive to light.

## 2024-11-04 NOTE — PROGRESS NOTES
CC -   HTN, Obesity - back and right leg pain    BACKGROUND -   Last visit: 10/7/2024  First visit: 3/15/23    Obesity (all weight in lbs)  Began on and off all her life - (thyroid medicine started in 1996), brinda after marriage  Initial Ht 63.25\", Wt 232.0,  BMI 40.77  HS Grad wt 140-145   Lowest   wt 135   Highest  wt 265 (1991-93)  Pattern of wt gain: gradual ->up and down  Wt change past yr: ~-20-25 lbs  Most wt lost: unsure - ~35 lbs  Other diets attempted: intermittent fasting with walking, tried some diet plans in the past    Initial Diet:    Number of meals per day - 3    Number of snacks per day - 1    Meal volume - 8\" plate,  occasionally seconds    Fast food/convenience store - 1x/week    Restaurants (not fast food) - 2x/week   Sweets - 0d/week   Chips - 3-4d/week   Crackers/pretzels - 3-4d/week   Nuts - 3-4d/week - (combined 7d/wk)   Peanut Butter - 1-2d/week (1 tbsp on waffle)   Popcorn - 0d/week   Dried fruit - 0d/week   Whole fruit - 7d/week (banana daily, blueberry/apple 2-3d/wk)   Breakfast cereal - 0-1d/week -small cup of oatmeal   Granola/Protein/Energy bar - 0d/week   Sugar sweetened beverages - occ pop/soda <1x/wk gingerale, apple juice 2-3x/wk if not apple, coffee 18 oz/day no sugar with cream -  small cups 10 cups of regular coffeemate, ~1x/wk tea with sugar or 1 tsp honey, no energy drinks   Protein - No supplements   Fiber - No supplements    Mandarin orange with 100% juice or grapefruit sections with 100% juice daily.     Initial Exercise:    Gym membership - not currently, has foot pedal at home    Walking - 20+20 min 5d/wk at work, plus on weekends when weather is nice.    Running - no    Resistance - no    Aerobic class - pedals, see above     Sleep: ~7 hrs, rested mostly.  ______________________    PFSH -  Past Medical History:   Diagnosis Date    Back pain     Hypertension     Thyroid disease      Past Surgical History:   Procedure Laterality Date    BREAST LUMPECTOMY Left     2

## 2024-11-11 ENCOUNTER — TELEPHONE (OUTPATIENT)
Dept: BARIATRICS/WEIGHT MGMT | Age: 69
End: 2024-11-11

## 2024-12-11 ENCOUNTER — OFFICE VISIT (OUTPATIENT)
Dept: BARIATRICS/WEIGHT MGMT | Age: 69
End: 2024-12-11
Payer: COMMERCIAL

## 2024-12-11 VITALS
SYSTOLIC BLOOD PRESSURE: 137 MMHG | TEMPERATURE: 97 F | HEIGHT: 63 IN | DIASTOLIC BLOOD PRESSURE: 68 MMHG | WEIGHT: 196.2 LBS | BODY MASS INDEX: 34.76 KG/M2 | HEART RATE: 62 BPM

## 2024-12-11 DIAGNOSIS — E66.09 CLASS 1 OBESITY DUE TO EXCESS CALORIES WITH SERIOUS COMORBIDITY AND BODY MASS INDEX (BMI) OF 34.0 TO 34.9 IN ADULT: ICD-10-CM

## 2024-12-11 DIAGNOSIS — E66.811 CLASS 1 OBESITY DUE TO EXCESS CALORIES WITH SERIOUS COMORBIDITY AND BODY MASS INDEX (BMI) OF 34.0 TO 34.9 IN ADULT: ICD-10-CM

## 2024-12-11 DIAGNOSIS — I10 ESSENTIAL HYPERTENSION: Primary | ICD-10-CM

## 2024-12-11 PROCEDURE — 99211 OFF/OP EST MAY X REQ PHY/QHP: CPT

## 2024-12-11 PROCEDURE — 1123F ACP DISCUSS/DSCN MKR DOCD: CPT | Performed by: INTERNAL MEDICINE

## 2024-12-11 PROCEDURE — 1036F TOBACCO NON-USER: CPT | Performed by: INTERNAL MEDICINE

## 2024-12-11 PROCEDURE — G8484 FLU IMMUNIZE NO ADMIN: HCPCS | Performed by: INTERNAL MEDICINE

## 2024-12-11 PROCEDURE — G8400 PT W/DXA NO RESULTS DOC: HCPCS | Performed by: INTERNAL MEDICINE

## 2024-12-11 PROCEDURE — 3075F SYST BP GE 130 - 139MM HG: CPT | Performed by: INTERNAL MEDICINE

## 2024-12-11 PROCEDURE — 3017F COLORECTAL CA SCREEN DOC REV: CPT | Performed by: INTERNAL MEDICINE

## 2024-12-11 PROCEDURE — G8427 DOCREV CUR MEDS BY ELIG CLIN: HCPCS | Performed by: INTERNAL MEDICINE

## 2024-12-11 PROCEDURE — 99214 OFFICE O/P EST MOD 30 MIN: CPT | Performed by: INTERNAL MEDICINE

## 2024-12-11 PROCEDURE — G8417 CALC BMI ABV UP PARAM F/U: HCPCS | Performed by: INTERNAL MEDICINE

## 2024-12-11 PROCEDURE — 1090F PRES/ABSN URINE INCON ASSESS: CPT | Performed by: INTERNAL MEDICINE

## 2024-12-11 PROCEDURE — 3078F DIAST BP <80 MM HG: CPT | Performed by: INTERNAL MEDICINE

## 2024-12-11 RX ORDER — PHENTERMINE AND TOPIRAMATE 3.75; 23 MG/1; MG/1
1 CAPSULE, EXTENDED RELEASE ORAL DAILY
Qty: 14 CAPSULE | Refills: 0 | Status: SHIPPED | OUTPATIENT
Start: 2024-12-11 | End: 2024-12-25

## 2024-12-11 RX ORDER — PHENTERMINE AND TOPIRAMATE 7.5; 46 MG/1; MG/1
1 CAPSULE, EXTENDED RELEASE ORAL DAILY
Qty: 30 CAPSULE | Refills: 2 | Status: SHIPPED | OUTPATIENT
Start: 2024-12-26 | End: 2025-01-25

## 2024-12-11 NOTE — PATIENT INSTRUCTIONS
Rules:  Limit sweets to one day per month  Limit chips/crackers/pretzels/nuts/popcorn to 150 emilie/day  Eliminate all sugar sweetened beverages (including fruit juice)  Limit restaurants (including fast food and food from a convenience store) to one time every two weeks while in town    Requirements:  Make sure protein intake is at least 65 grams per day  Make sure that fiber intake is at least 25 grams per day  Take one multivitamin every day    Targets:  Limit calorie intake to 1150 calories/day  Walk 30 minutes dailyor equivalent  Avoid eating 2 hours within bedtime.     Tips:  Do not eat outside of the dining room or the kitchen  Do not eat while watching TV, videos, working on the computer or using a smart phone  Do not eat food out of a multi-serving bag or container.     Low calorie non-starchy vegetables:  Food (per 100 g) Calories Fibers T. Carbohydrates Protein Fat Sodium (mg) Potassium (mg)   Green Bell Peppers 10 1.7 4.6 0.9 0.2 3 175   Cucumbers 15 0.5 3.6 0.7 0.1 2 147   Celery 16 1.6 3 0.7 0.2 80 260   Tomatoes 18 1.2 3.9 0.9 0.2 5 237   Carrots 41 2.8 10 0.9 0.2 69 320   Cabbage 25 2.5 6 1.3 0.1 18 170   Broccoli 35 3.3 7.2 2.4 0.4 41 293   Cauliflower 23 2.3 4.1 1.8 0.5 15 142   Iceberg Lettuce 14 1.2 3 0.9 0.1 10 141   Kale 28 2 5.6 1.9 0.4 23 228   Brussel Sprouts 43 3.8 9 3.4 0.3 25 389   Spinach 23 2.4 3.8 3 0.3 70 466   Zucchini 15 1 2.7 1.1 0.4 3 264   Mushroom 28 2.2 5.3 2.2 0.5 2 356   Asparagus 22 2 4.1 2.4 0.2 14 224   Green Beans 35 3.2 7.9 1.9 0.3 1 146   Eggplant 35 2.5 8.7 0.8 0.2 1 123     Qsymia:  Take Qsymia 3.75/23 mg, one tablet daily for two weeks; then increase the dose to 7.5/46 mg, one tablet daily from then on    While taking Qsymia, check the Blood Pressure every morning and every evening.     If the systolic BP is >155 mmHg, the diastolic BP is >90 mm/Hg or the heart rate is > 100 beats per minute, do not take Qsymia that day.    If the systolic BP is consistently >155 mmHg,

## 2025-04-17 ENCOUNTER — OFFICE VISIT (OUTPATIENT)
Dept: ORTHOPEDIC SURGERY | Age: 70
End: 2025-04-17
Payer: COMMERCIAL

## 2025-04-17 VITALS — WEIGHT: 200 LBS | BODY MASS INDEX: 32.14 KG/M2 | HEIGHT: 66 IN

## 2025-04-17 DIAGNOSIS — M25.512 BILATERAL SHOULDER PAIN, UNSPECIFIED CHRONICITY: Primary | ICD-10-CM

## 2025-04-17 DIAGNOSIS — S46.012A TRAUMATIC COMPLETE TEAR OF LEFT ROTATOR CUFF, INITIAL ENCOUNTER: Primary | ICD-10-CM

## 2025-04-17 DIAGNOSIS — M25.511 BILATERAL SHOULDER PAIN, UNSPECIFIED CHRONICITY: Primary | ICD-10-CM

## 2025-04-17 PROCEDURE — G8417 CALC BMI ABV UP PARAM F/U: HCPCS | Performed by: ORTHOPAEDIC SURGERY

## 2025-04-17 PROCEDURE — 1123F ACP DISCUSS/DSCN MKR DOCD: CPT | Performed by: ORTHOPAEDIC SURGERY

## 2025-04-17 PROCEDURE — 1036F TOBACCO NON-USER: CPT | Performed by: ORTHOPAEDIC SURGERY

## 2025-04-17 PROCEDURE — G8427 DOCREV CUR MEDS BY ELIG CLIN: HCPCS | Performed by: ORTHOPAEDIC SURGERY

## 2025-04-17 PROCEDURE — 1090F PRES/ABSN URINE INCON ASSESS: CPT | Performed by: ORTHOPAEDIC SURGERY

## 2025-04-17 PROCEDURE — G8400 PT W/DXA NO RESULTS DOC: HCPCS | Performed by: ORTHOPAEDIC SURGERY

## 2025-04-17 PROCEDURE — 99213 OFFICE O/P EST LOW 20 MIN: CPT | Performed by: ORTHOPAEDIC SURGERY

## 2025-04-17 PROCEDURE — 3017F COLORECTAL CA SCREEN DOC REV: CPT | Performed by: ORTHOPAEDIC SURGERY

## 2025-04-17 NOTE — PROGRESS NOTES
Chief Complaint   Patient presents with    Shoulder Pain     Bilateral Shoulder x months, L>R, states of tripping a couple weeks ago and pushed out her left shoulder to save her and now has increased pain in shoulder.  No previous shoulder or neck surgery.          Leticia Nogueira is a 70 y.o. year old   female who is seen today  for b/l shoulder pain L>R. She had a fall a few weeks ago. She reports having trouble with range of motion and weakness with lifting. She has been taking OTC medications as needed.    Chief Complaint   Patient presents with    Shoulder Pain     Bilateral Shoulder x months, L>R, states of tripping a couple weeks ago and pushed out her left shoulder to save her and now has increased pain in shoulder.  No previous shoulder or neck surgery.       Past Medical History:   Diagnosis Date    Back pain     Hypertension     Thyroid disease      Past Surgical History:   Procedure Laterality Date    BREAST LUMPECTOMY Left     2 lumpectomy - was benign     SECTION      2 C-sections       Current Outpatient Medications:     polyethylene glycol (GLYCOLAX) 17 GM/SCOOP powder, Take 17 g by mouth every other day, Disp: , Rfl:     docusate sodium (COLACE) 150 MG/15ML liquid, Take 10 mLs by mouth daily, Disp: 300 mL, Rfl: 0    SYNTHROID 100 MCG tablet, Take 1 tablet by mouth daily, Disp: , Rfl:     losartan (COZAAR) 50 MG tablet, Take 1 tablet by mouth daily, Disp: , Rfl:     gabapentin (NEURONTIN) 300 MG capsule, Take 1 capsule by mouth nightly., Disp: , Rfl:   Allergies   Allergen Reactions    Sulfa Antibiotics Other (See Comments)    Penicillin G Rash    Penicillins Hives and Rash     Social History     Socioeconomic History    Marital status:      Spouse name: Not on file    Number of children: Not on file    Years of education: Not on file    Highest education level: Not on file   Occupational History    Not on file   Tobacco Use    Smoking status: Former     Current packs/day: 0.00

## 2025-06-12 ENCOUNTER — OFFICE VISIT (OUTPATIENT)
Age: 70
End: 2025-06-12
Payer: COMMERCIAL

## 2025-06-12 VITALS
HEIGHT: 63 IN | TEMPERATURE: 98.3 F | SYSTOLIC BLOOD PRESSURE: 157 MMHG | BODY MASS INDEX: 39.76 KG/M2 | WEIGHT: 224.4 LBS | HEART RATE: 61 BPM | DIASTOLIC BLOOD PRESSURE: 55 MMHG

## 2025-06-12 DIAGNOSIS — E66.812 CLASS 2 SEVERE OBESITY DUE TO EXCESS CALORIES WITH SERIOUS COMORBIDITY AND BODY MASS INDEX (BMI) OF 39.0 TO 39.9 IN ADULT (HCC): ICD-10-CM

## 2025-06-12 DIAGNOSIS — E66.01 CLASS 2 SEVERE OBESITY DUE TO EXCESS CALORIES WITH SERIOUS COMORBIDITY AND BODY MASS INDEX (BMI) OF 39.0 TO 39.9 IN ADULT (HCC): ICD-10-CM

## 2025-06-12 DIAGNOSIS — I10 ESSENTIAL HYPERTENSION: Primary | ICD-10-CM

## 2025-06-12 PROCEDURE — 3017F COLORECTAL CA SCREEN DOC REV: CPT | Performed by: INTERNAL MEDICINE

## 2025-06-12 PROCEDURE — 3078F DIAST BP <80 MM HG: CPT | Performed by: INTERNAL MEDICINE

## 2025-06-12 PROCEDURE — 1036F TOBACCO NON-USER: CPT | Performed by: INTERNAL MEDICINE

## 2025-06-12 PROCEDURE — G8400 PT W/DXA NO RESULTS DOC: HCPCS | Performed by: INTERNAL MEDICINE

## 2025-06-12 PROCEDURE — G8428 CUR MEDS NOT DOCUMENT: HCPCS | Performed by: INTERNAL MEDICINE

## 2025-06-12 PROCEDURE — G8417 CALC BMI ABV UP PARAM F/U: HCPCS | Performed by: INTERNAL MEDICINE

## 2025-06-12 PROCEDURE — 3077F SYST BP >= 140 MM HG: CPT | Performed by: INTERNAL MEDICINE

## 2025-06-12 PROCEDURE — 1090F PRES/ABSN URINE INCON ASSESS: CPT | Performed by: INTERNAL MEDICINE

## 2025-06-12 PROCEDURE — 1123F ACP DISCUSS/DSCN MKR DOCD: CPT | Performed by: INTERNAL MEDICINE

## 2025-06-12 PROCEDURE — 99214 OFFICE O/P EST MOD 30 MIN: CPT | Performed by: INTERNAL MEDICINE

## 2025-06-12 RX ORDER — PHENTERMINE HYDROCHLORIDE 37.5 MG/1
TABLET ORAL
Qty: 30 TABLET | Refills: 0 | Status: SHIPPED | OUTPATIENT
Start: 2025-06-12 | End: 2025-07-12

## 2025-06-12 NOTE — PATIENT INSTRUCTIONS
Rules:  Limit sweets to one day per month  Limit chips/crackers/pretzels/nuts/popcorn to 150 emilie/day  Eliminate all sugar sweetened beverages (including fruit juice)  Limit restaurants (including fast food and food from a convenience store) to one time every two weeks while in town    Requirements:  Make sure protein intake is at least 65 grams per day  Make sure that fiber intake is at least 25 grams per day  Take one multivitamin every day    Targets:  Limit calorie intake to 1150 calories/day  Walk 30 minutes dailyor equivalent  Avoid eating 2 hours within bedtime.     Tips:  Do not eat outside of the dining room or the kitchen  Do not eat while watching TV, videos, working on the computer or using a smart phone  Do not eat food out of a multi-serving bag or container.    Please ask your insurance about coverage on Zepbound (starting dose 2.5 mg weekly), Wegovy (starting dose 0.25 mg weekly) or Saxenda (starting dose 0.6 mg daily).    Phentermine:  Take phentermine 37.5 mg, one-half to one tablet daily as needed for appetite suppression. Take each dose 30-90 min before effect will be needed.    While taking phentermine, check the Blood Pressure every morning and every evening.     If the systolic BP is >155 mmHg, the diastolic BP is >90 mm/Hg or the heart rate is > 100 beats per minute, do not take phentermine that day.    If the systolic BP is consistently >155 mmHg, the diastolic BP is consistently above 90 mm/Hg or the heart rate is consistently > 100 beats per minute, then stop taking phentermine altogether.    If the systolic BP >170 mmHg or the diastolic BP is >100 mmHg (even if it is only once), then phentermine should be stopped altogether without proving that any of these are consistently elevated.    Goal weight   For September '25: 213,1 lbs or less;       Follow up in 30 days.

## 2025-06-12 NOTE — PROGRESS NOTES
(101 days).  9/4/24 - 12/11/24 : 201.2 - 196.2 = -2.5% change in body wt in about 3 months.    Patient's estimated daily energy need (DEN) = 1763 Shade/d = 49649 Shade/wk  Wt effect of HR foods (SSB, FF, salty snacks)= 1400 Shade/wk = 200 Shade/d= 11.3% DEN = 21 lb/year.    Update:  Stress at work  Has not been watching caloric intake - felt discouraged and did not do. But would like to restart  Sweets: pie - 2x/m, not much sweet otherwise  Potato chips mainly  SSB: occ apple juice with miralax (mostly with water  RF/FF: orders out during lunch at work (protein shake caused discomfort)  Appetite suppressant: none currently. Did not start  Home BP monitoring: has been wnl  Protein: Meat eg chicken thighs and egg whites  Fiber: trying to get most fiber from her food - broccoli, butternut squash, 1/4 cup of walnuts  Water: ~70 oz/day or more  Activities: 8-10k steps/day.     Assessment  - Uncontrolled; has had weight gain over the last 6 months. She feels it has been difficult to maintain low calorie diet without an appetite suppressant and would like to restart Phentermine. BP high today, but usu controlled, and she will watch BP closely and avoid Phentermine if BP is high. No contraindication to start Phentermine otherwise.    Plan   -   Rules:  Limit sweets to one day per month  Limit chips/crackers/pretzels/nuts/popcorn to 150 shade/day  Eliminate all sugar sweetened beverages (including fruit juice)  Limit restaurants (including fast food and food from a convenience store) to one time every two weeks while in town    Requirements:  Make sure protein intake is at least 65 grams per day  Make sure that fiber intake is at least 25 grams per day  Take one multivitamin every day    Targets:  Limit calorie intake to 1150 calories/day  Walk 30 minutes dailyor equivalent  Avoid eating 2 hours within bedtime.     Tips:  Do not eat outside of the dining room or the kitchen  Do not eat while watching TV, videos, working on the